# Patient Record
Sex: MALE | Race: WHITE | NOT HISPANIC OR LATINO | Employment: FULL TIME | URBAN - METROPOLITAN AREA
[De-identification: names, ages, dates, MRNs, and addresses within clinical notes are randomized per-mention and may not be internally consistent; named-entity substitution may affect disease eponyms.]

---

## 2019-06-12 ENCOUNTER — TELEPHONE (OUTPATIENT)
Dept: SCHEDULING | Facility: IMAGING CENTER | Age: 39
End: 2019-06-12

## 2019-06-13 ENCOUNTER — OFFICE VISIT (OUTPATIENT)
Dept: INTERNAL MEDICINE | Facility: MEDICAL CENTER | Age: 39
End: 2019-06-13

## 2019-06-13 VITALS
HEART RATE: 88 BPM | OXYGEN SATURATION: 94 % | TEMPERATURE: 98.4 F | SYSTOLIC BLOOD PRESSURE: 140 MMHG | BODY MASS INDEX: 39.57 KG/M2 | WEIGHT: 276.4 LBS | HEIGHT: 70 IN | DIASTOLIC BLOOD PRESSURE: 100 MMHG

## 2019-06-13 DIAGNOSIS — R10.13 EPIGASTRIC PAIN: ICD-10-CM

## 2019-06-13 DIAGNOSIS — E66.9 OBESITY (BMI 30-39.9): ICD-10-CM

## 2019-06-13 DIAGNOSIS — K29.50 OTHER CHRONIC GASTRITIS WITHOUT HEMORRHAGE: ICD-10-CM

## 2019-06-13 DIAGNOSIS — K21.9 GASTROESOPHAGEAL REFLUX DISEASE, ESOPHAGITIS PRESENCE NOT SPECIFIED: ICD-10-CM

## 2019-06-13 DIAGNOSIS — R53.83 LETHARGY: ICD-10-CM

## 2019-06-13 PROCEDURE — 99204 OFFICE O/P NEW MOD 45 MIN: CPT | Mod: GC | Performed by: INTERNAL MEDICINE

## 2019-06-13 RX ORDER — PANTOPRAZOLE SODIUM 40 MG/1
40 TABLET, DELAYED RELEASE ORAL DAILY
Qty: 90 TAB | Refills: 2 | Status: SHIPPED | OUTPATIENT
Start: 2019-06-13 | End: 2023-09-18

## 2019-06-13 RX ORDER — ESOMEPRAZOLE MAGNESIUM 20 MG/1
2 TABLET, DELAYED RELEASE ORAL DAILY
COMMUNITY
End: 2019-06-13

## 2019-06-13 RX ORDER — SUCRALFATE 1 G/1
1 TABLET ORAL
Qty: 120 TAB | Refills: 1 | Status: SHIPPED | OUTPATIENT
Start: 2019-06-13 | End: 2023-09-18

## 2019-06-13 RX ORDER — AMOXICILLIN 500 MG/1
CAPSULE ORAL
Refills: 0 | COMMUNITY
Start: 2019-05-09 | End: 2019-06-13

## 2019-06-13 NOTE — PROGRESS NOTES
New Patient to Establish    Reason to establish: New patient to establish    CC: epigastric pain, heartburn    HPI:   38-year-old pleasant male with past medical history of GERD, alcoholism (sober since 2017), questionable bipolar disorder presents to establish care and complains of epigastric pain and heartburn.    Patient complains of 4/10 vague constant nagging sick feeling and pain in the epigastric region for the past 5 days. It began after an alcohol binge where he drank half a gallon of whiskey, over 2 day period. The pain has not gradually worsened. Has associated heartburn and nausea, but no vomiting. Feels better for 20 minutes after meal intake, but then the pain recurs.   No melena, blood per rectum. Bowel movements have been irregular, no consistent diarrhea or constipation.   40 pound weight gain over the past 6 months. No changes in appetite. Has some night sweats, but denies fever or chills.     He had the same symptoms about a month ago, that lasted 3-4 days ago. Taking pantoprazole for a month after that and ran out of those about 8-9 days ago. Started taking nexium after that and is on that currently at 40mg a day. Notes mild improvement in epigastric symptoms today.      Patient gives h/o GERD symptoms since high school.   Ibuprofen 1-2 times a week for headaches and minor aches and pains, not daily.     Patient Active Problem List    Diagnosis Date Noted   • Obesity (BMI 30-39.9) 06/13/2019   • Upper abdominal pressure and constipation 02/01/2016   • Bipolar affective, mixed (HCC) 02/24/2014   • Panic attacks 02/24/2014   • Healthcare maintenance 02/24/2014       Past Medical History:   Diagnosis Date   • Anxiety    • Bipolar affective disorder (HCC)    • Depression        Current Outpatient Prescriptions   Medication Sig Dispense Refill   • pantoprazole (PROTONIX) 40 MG Tablet Delayed Response Take 1 Tab by mouth every day. 90 Tab 2   • sucralfate (CARAFATE) 1 GM Tab Take 1 Tab by mouth 4  Times a Day,Before Meals and at Bedtime. 120 Tab 1     No current facility-administered medications for this visit.        Allergies as of 06/13/2019   • (No Known Allergies)       Social History     Social History   • Marital status:      Spouse name: N/A   • Number of children: N/A   • Years of education: N/A     Occupational History   • Not on file.     Social History Main Topics   • Smoking status: Former Smoker     Packs/day: 1.00     Years: 3.00     Types: Cigarettes     Quit date: 2000   • Smokeless tobacco: Current User     Types: Chew      Comment: chewing tobacco since 2000   • Alcohol use No      Comment: alcohol use disorder prior to 2017   • Drug use: No   • Sexual activity: Not on file     Other Topics Concern   • Not on file     Social History Narrative   • No narrative on file       Family History   Problem Relation Age of Onset   • Cancer Mother         ovarian cancer?   • Alcohol/Drug Father    • Breast Cancer Paternal Grandmother        History reviewed. No pertinent surgical history.    ROS: As per HPI. Additional pertinent systems as noted below.  Constitutional: Negative for chills and fever.   HENT: Negative for ear pain and sore throat.    Eyes: Negative for discharge and redness.   Respiratory: Negative for cough, hemoptysis, wheezing and stridor.    Cardiovascular: Negative for chest pain, palpitations and leg swelling.   Gastrointestinal: epigastric discomfort and heartburn and nausea. No other abdominal pain, constipation, diarrhea, melena, blood per rectum, or vomiting.   Genitourinary: Negative for dysuria, flank pain and hematuria.   Musculoskeletal: Negative for falls and myalgias.   Skin: Negative for itching and rash.   Neurological: Negative for dizziness, seizures, loss of consciousness and headaches.   Endo/Heme/Allergies: Negative for polydipsia. Does not bruise/bleed easily.   Psychiatric/Behavioral: Negative for substance abuse and suicidal ideas.       /100 (BP  "Location: Left arm, Patient Position: Sitting, BP Cuff Size: Large adult)   Pulse 88   Temp 36.9 °C (98.4 °F)   Ht 1.784 m (5' 10.25\")   Wt (!) 125.4 kg (276 lb 6.4 oz)   SpO2 94%   BMI 39.38 kg/m²     Physical Exam  General:  Alert and oriented, No apparent distress.    Eyes: Pupils equal and reactive. No scleral icterus.    Throat: Clear no erythema or exudates noted.    Neck: Supple. No lymphadenopathy noted. Thyroid not enlarged.    Lungs: Clear to auscultation bilaterally with no wheezing or crackles.    Cardiovascular: Regular rate and rhythm. No murmurs, rubs or gallops.    Abdomen:  Benign. No rebound or guarding noted. Bowel sounds audible. No organomegaly    Extremities: No clubbing, cyanosis, edema.    Skin: Clear. No rash or suspicious skin lesions noted.      Note: I have reviewed all pertinent labs and diagnostic tests associated with this visit with specific comments listed under the assessment and plan below    Assessment and Plan    1. Epigastric pain  2. Other chronic gastritis without hemorrhage  3. Gastroesophageal reflux disease, esophagitis presence not specified  4. Lethargy  Patient with intermittent long standing epigastric complaints associated with heartburn and no red flags. However, he does complain of lethargy and fatigue. Will check CBC for anemia. Has never remained on PPI therapy for prolonged periods of time, likely cause of recurrence.   Concern for GERD, dyspepsia, gastritis, PUD, pancreatitis.   Recommended daily prolonged use of PPI and a few weeks of carafate use. Will reassess symptom resolution and if not resolving or worsening will consider H Pylori testing and/or GI referral for EGD.   -     pantoprazole (PROTONIX) 40 MG Tablet Delayed Response; Take 1 Tab by mouth every day.  -     sucralfate (CARAFATE) 1 GM Tab; Take 1 Tab by mouth 4 Times a Day,Before Meals and at Bedtime.  -     CBC WITH DIFFERENTIAL  -     LIPASE; Future  -     Comp Metabolic Panel; Future    5. " Obesity (BMI 30-39.9)  -     Patient identified as having weight management issue.  Appropriate orders and counseling given.      Followup: Return in about 4 weeks (around 7/11/2019).    Risk Assessment (discuss potential complications a function of chronic problems): x    Complexity (discuss number of co-morbidities): x    Signed by: Jorge Keller M.D.

## 2019-06-13 NOTE — PATIENT INSTRUCTIONS
Gastritis, Adult  Gastritis is soreness and swelling (inflammation) of the lining of the stomach. Gastritis can develop as a sudden onset (acute) or long-term (chronic) condition. If gastritis is not treated, it can lead to stomach bleeding and ulcers.  CAUSES   Gastritis occurs when the stomach lining is weak or damaged. Digestive juices from the stomach then inflame the weakened stomach lining. The stomach lining may be weak or damaged due to viral or bacterial infections. One common bacterial infection is the Helicobacter pylori infection. Gastritis can also result from excessive alcohol consumption, taking certain medicines, or having too much acid in the stomach.   SYMPTOMS   In some cases, there are no symptoms. When symptoms are present, they may include:  · Pain or a burning sensation in the upper abdomen.  · Nausea.  · Vomiting.  · An uncomfortable feeling of fullness after eating.  DIAGNOSIS   Your caregiver may suspect you have gastritis based on your symptoms and a physical exam. To determine the cause of your gastritis, your caregiver may perform the following:  · Blood or stool tests to check for the H pylori bacterium.  · Gastroscopy. A thin, flexible tube (endoscope) is passed down the esophagus and into the stomach. The endoscope has a light and camera on the end. Your caregiver uses the endoscope to view the inside of the stomach.  · Taking a tissue sample (biopsy) from the stomach to examine under a microscope.  TREATMENT   Depending on the cause of your gastritis, medicines may be prescribed. If you have a bacterial infection, such as an H pylori infection, antibiotics may be given. If your gastritis is caused by too much acid in the stomach, H2 blockers or antacids may be given. Your caregiver may recommend that you stop taking aspirin, ibuprofen, or other nonsteroidal anti-inflammatory drugs (NSAIDs).  HOME CARE INSTRUCTIONS  · Only take over-the-counter or prescription medicines as directed by  your caregiver.  · If you were given antibiotic medicines, take them as directed. Finish them even if you start to feel better.  · Drink enough fluids to keep your urine clear or pale yellow.  · Avoid foods and drinks that make your symptoms worse, such as:  ¨ Caffeine or alcoholic drinks.  ¨ Chocolate.  ¨ Peppermint or mint flavorings.  ¨ Garlic and onions.  ¨ Spicy foods.  ¨ Citrus fruits, such as oranges, sherri, or limes.  ¨ Tomato-based foods such as sauce, chili, salsa, and pizza.  ¨ Fried and fatty foods.  · Eat small, frequent meals instead of large meals.  SEEK IMMEDIATE MEDICAL CARE IF:   · You have black or dark red stools.  · You vomit blood or material that looks like coffee grounds.  · You are unable to keep fluids down.  · Your abdominal pain gets worse.  · You have a fever.  · You do not feel better after 1 week.  · You have any other questions or concerns.  MAKE SURE YOU:  · Understand these instructions.  · Will watch your condition.  · Will get help right away if you are not doing well or get worse.  This information is not intended to replace advice given to you by your health care provider. Make sure you discuss any questions you have with your health care provider.  Document Released: 12/12/2002 Document Revised: 06/18/2013 Document Reviewed: 09/10/2016  [employer] Interactive Patient Education © 2017 [employer]

## 2020-01-10 ENCOUNTER — TELEPHONE (OUTPATIENT)
Dept: SCHEDULING | Facility: IMAGING CENTER | Age: 40
End: 2020-01-10

## 2020-01-14 ENCOUNTER — HOSPITAL ENCOUNTER (OUTPATIENT)
Dept: LAB | Facility: MEDICAL CENTER | Age: 40
End: 2020-01-14
Attending: NURSE PRACTITIONER
Payer: COMMERCIAL

## 2020-01-14 ENCOUNTER — OFFICE VISIT (OUTPATIENT)
Dept: MEDICAL GROUP | Facility: PHYSICIAN GROUP | Age: 40
End: 2020-01-14
Payer: COMMERCIAL

## 2020-01-14 VITALS
WEIGHT: 280.4 LBS | DIASTOLIC BLOOD PRESSURE: 90 MMHG | SYSTOLIC BLOOD PRESSURE: 136 MMHG | HEIGHT: 70 IN | OXYGEN SATURATION: 94 % | BODY MASS INDEX: 40.14 KG/M2 | HEART RATE: 74 BPM | TEMPERATURE: 98.1 F

## 2020-01-14 DIAGNOSIS — Z23 NEED FOR VACCINATION: ICD-10-CM

## 2020-01-14 DIAGNOSIS — Z00.00 ANNUAL PHYSICAL EXAM: ICD-10-CM

## 2020-01-14 DIAGNOSIS — F31.60 BIPOLAR AFFECTIVE, MIXED (HCC): ICD-10-CM

## 2020-01-14 LAB
ALBUMIN SERPL BCP-MCNC: 4.9 G/DL (ref 3.2–4.9)
ALBUMIN/GLOB SERPL: 1.5 G/DL
ALP SERPL-CCNC: 47 U/L (ref 30–99)
ALT SERPL-CCNC: 51 U/L (ref 2–50)
ANION GAP SERPL CALC-SCNC: 8 MMOL/L (ref 0–11.9)
AST SERPL-CCNC: 29 U/L (ref 12–45)
BASOPHILS # BLD AUTO: 0.8 % (ref 0–1.8)
BASOPHILS # BLD: 0.07 K/UL (ref 0–0.12)
BILIRUB SERPL-MCNC: 0.6 MG/DL (ref 0.1–1.5)
BUN SERPL-MCNC: 13 MG/DL (ref 8–22)
CALCIUM SERPL-MCNC: 10.2 MG/DL (ref 8.5–10.5)
CHLORIDE SERPL-SCNC: 102 MMOL/L (ref 96–112)
CHOLEST SERPL-MCNC: 227 MG/DL (ref 100–199)
CO2 SERPL-SCNC: 29 MMOL/L (ref 20–33)
CREAT SERPL-MCNC: 1.03 MG/DL (ref 0.5–1.4)
EOSINOPHIL # BLD AUTO: 0.19 K/UL (ref 0–0.51)
EOSINOPHIL NFR BLD: 2.2 % (ref 0–6.9)
ERYTHROCYTE [DISTWIDTH] IN BLOOD BY AUTOMATED COUNT: 43.7 FL (ref 35.9–50)
EST. AVERAGE GLUCOSE BLD GHB EST-MCNC: 134 MG/DL
GLOBULIN SER CALC-MCNC: 3.3 G/DL (ref 1.9–3.5)
GLUCOSE SERPL-MCNC: 92 MG/DL (ref 65–99)
HBA1C MFR BLD: 6.3 % (ref 0–5.6)
HCT VFR BLD AUTO: 47.8 % (ref 42–52)
HDLC SERPL-MCNC: 36 MG/DL
HGB BLD-MCNC: 16.1 G/DL (ref 14–18)
IMM GRANULOCYTES # BLD AUTO: 0.03 K/UL (ref 0–0.11)
IMM GRANULOCYTES NFR BLD AUTO: 0.3 % (ref 0–0.9)
LDLC SERPL CALC-MCNC: 140 MG/DL
LYMPHOCYTES # BLD AUTO: 4.06 K/UL (ref 1–4.8)
LYMPHOCYTES NFR BLD: 46.3 % (ref 22–41)
MCH RBC QN AUTO: 31.4 PG (ref 27–33)
MCHC RBC AUTO-ENTMCNC: 33.7 G/DL (ref 33.7–35.3)
MCV RBC AUTO: 93.4 FL (ref 81.4–97.8)
MONOCYTES # BLD AUTO: 0.46 K/UL (ref 0–0.85)
MONOCYTES NFR BLD AUTO: 5.2 % (ref 0–13.4)
NEUTROPHILS # BLD AUTO: 3.96 K/UL (ref 1.82–7.42)
NEUTROPHILS NFR BLD: 45.2 % (ref 44–72)
NRBC # BLD AUTO: 0 K/UL
NRBC BLD-RTO: 0 /100 WBC
PLATELET # BLD AUTO: 243 K/UL (ref 164–446)
PMV BLD AUTO: 11.1 FL (ref 9–12.9)
POTASSIUM SERPL-SCNC: 4.1 MMOL/L (ref 3.6–5.5)
PROT SERPL-MCNC: 8.2 G/DL (ref 6–8.2)
RBC # BLD AUTO: 5.12 M/UL (ref 4.7–6.1)
SODIUM SERPL-SCNC: 139 MMOL/L (ref 135–145)
TRIGL SERPL-MCNC: 257 MG/DL (ref 0–149)
WBC # BLD AUTO: 8.8 K/UL (ref 4.8–10.8)

## 2020-01-14 PROCEDURE — 82306 VITAMIN D 25 HYDROXY: CPT

## 2020-01-14 PROCEDURE — 36415 COLL VENOUS BLD VENIPUNCTURE: CPT

## 2020-01-14 PROCEDURE — 90471 IMMUNIZATION ADMIN: CPT | Performed by: NURSE PRACTITIONER

## 2020-01-14 PROCEDURE — 80053 COMPREHEN METABOLIC PANEL: CPT

## 2020-01-14 PROCEDURE — 85025 COMPLETE CBC W/AUTO DIFF WBC: CPT

## 2020-01-14 PROCEDURE — 90686 IIV4 VACC NO PRSV 0.5 ML IM: CPT | Performed by: NURSE PRACTITIONER

## 2020-01-14 PROCEDURE — 83036 HEMOGLOBIN GLYCOSYLATED A1C: CPT

## 2020-01-14 PROCEDURE — 84443 ASSAY THYROID STIM HORMONE: CPT

## 2020-01-14 PROCEDURE — 84439 ASSAY OF FREE THYROXINE: CPT

## 2020-01-14 PROCEDURE — 99214 OFFICE O/P EST MOD 30 MIN: CPT | Mod: 25 | Performed by: NURSE PRACTITIONER

## 2020-01-14 PROCEDURE — 80061 LIPID PANEL: CPT

## 2020-01-14 NOTE — PROGRESS NOTES
Chief Complaint   Patient presents with   • Establish Care     Prior [facility] pt, HX of mental health, Hx of HTN, GERD       HISTORY OF PRESENT ILLNESS: Patient is a 39 y.o. male, established patient who presents today to discuss medical problems as listed below:    Health Maintenance:  COMPLETED.    Bipolar affective, mixed  New issue for this provider, Dx'd at , during a manic episode.  In the hospital he was started on lithium and Xanax.  I did like an effect of lithium.  Patient started using alcohol and smoking as he is coping strategies.  He quit drinking in , stopped smoking 2 years ago, chew tobacco for 2 years transitioning from smoking habits, and quit chewing 6 weeks ago, after which he went through of withdrawal.  Today pt denies SI/HI, no recent manic episodes.      Patient Active Problem List    Diagnosis Date Noted   • Annual physical exam 2020   • Obesity (BMI 30-39.9) 2019   • Upper abdominal pressure and constipation 2016   • Bipolar affective, mixed (HCC) 2014   • Panic attacks 2014   • Healthcare maintenance 2014        Allergies: Patient has no known allergies.    Current Outpatient Medications   Medication Sig Dispense Refill   • pantoprazole (PROTONIX) 40 MG Tablet Delayed Response Take 1 Tab by mouth every day. 90 Tab 2   • sucralfate (CARAFATE) 1 GM Tab Take 1 Tab by mouth 4 Times a Day,Before Meals and at Bedtime. (Patient not taking: Reported on 2020) 120 Tab 1     No current facility-administered medications for this visit.        Social History     Tobacco Use   • Smoking status: Former Smoker     Packs/day: 1.00     Years: 3.00     Pack years: 3.00     Types: Cigarettes     Last attempt to quit: 2000     Years since quittin.0   • Smokeless tobacco: Former User     Types: Chew     Quit date: 2019   • Tobacco comment: chewing tobacco since    Substance Use Topics   • Alcohol use: No     Comment: alcohol use disorder prior to  "2017   • Drug use: Yes     Types: Marijuana     Social History     Patient does not qualify to have social determinant information on file (likely too young).   Social History Narrative   • Not on file       Family History   Problem Relation Age of Onset   • Cancer Mother 55        ovarian cancer   • Alcohol/Drug Father    • Breast Cancer Paternal Grandmother        Allergies, past medical history, past surgical history, family history, social history reviewed and updated.    Review of Systems:      - Constitutional: Negative for fever, chills, unexpected weight change, and fatigue/generalized weakness.     - HEENT: Negative for headaches, vision changes, hearing changes, ear pain, ear discharge, rhinorrhea, sinus congestion, sore throat, and neck pain.      - Respiratory: Negative for cough, sputum production, chest congestion, dyspnea, wheezing, and crackles.      - Cardiovascular: Negative for chest pain, palpitations, orthopnea, and bilateral lower extremity edema.     - Gastrointestinal: On and off epigastric discomfort and pain.  Negative for heartburn, nausea, vomiting,  hematochezia, melena, diarrhea, constipation, and greasy/foul-smelling stools.     - Genitourinary: Negative for dysuria, polyuria, hematuria, pyuria, urinary urgency, and urinary incontinence.    - Musculoskeletal: Negative for myalgias, back pain, and joint pain.     - Skin: Negative for rash, itching, cyanotic skin color change.     - Neurological: Negative for dizziness, tingling, tremors, focal sensory deficit, focal weakness and headaches.     - Endo/Heme/Allergies: Does not bruise/bleed easily.     - Psychiatric/Behavioral: Positive for chronic intermittent depression anxiety.  Negative for suicidal/homicidal ideation and memory loss.      All other systems reviewed and are negative    Exam:    /90 (BP Location: Left arm, Patient Position: Sitting)   Pulse 74   Temp 36.7 °C (98.1 °F) (Temporal)   Ht 1.778 m (5' 10\")   Wt (!) " 127.2 kg (280 lb 6.4 oz)   SpO2 94%   BMI 40.23 kg/m²  Body mass index is 40.23 kg/m².    Physical Exam:  Constitutional: Well-developed and well-nourished. Not diaphoretic. No distress.   Skin: Skin is warm and dry. No rash noted.  Head: Atraumatic without lesions.  Eyes: Conjunctivae and extraocular motions are normal. Pupils are equal, round, and reactive to light. No scleral icterus.   Ears:  External ears unremarkable. Tympanic membranes clear and intact.  Nose: Nares patent. Septum midline. Turbinates without erythema nor edema. No discharge.   Mouth/Throat: Dentition is normal. Tongue normal. Oropharynx is clear and moist. Posterior pharynx without erythema or exudates.  Neck: Supple, trachea midline. Normal range of motion. No thyromegaly present. No lymphadenopathy--cervical or supraclavicular.  Cardiovascular: Regular rate and rhythm, S1 and S2 without murmur, rubs, or gallops.    Chest: Effort normal. Clear to auscultation throughout. No adventitious sounds. No CVA tenderness.  Abdomen: Soft, non tender, and without distention. Active bowel sounds in all four quadrants. No rebound, guarding, masses or HSM.  : Negative for dysuria, polyuria, hematuria, pyuria, urinary urgency, and urinary incontinence.  Extremities: No cyanosis, clubbing, erythema, nor edema. Distal pulses intact and symmetric.   Musculoskeletal: All major joints AROM full in all directions without pain.  Neurological: Alert and oriented x 3. DTRs 2+/3 and symmetric. No cranial nerve deficit. 5/5 myotomes. Sensation intact. Negative Rhomberg.  Psychiatric:  Behavior, mood, and affect are appropriate.    Assessment/Plan:  1. Need for vaccination  - Influenza Vaccine Quad Injection (PF)    2. Bipolar affective, mixed (HCC)  Uncontrolled.  Patient to follow-up with psychiatry and behavioral health.  Discussed healthy lifestyle such as healthy nutrition and exercise, choices discussed.  Patient was given handouts to [facility] behavioral  health crisis center and [facility] in case of emergencies.  He was also made aware of national suicide line and advised to call 911 if experiencing emergency.  - REFERRAL TO BEHAVIORAL HEALTH  - REFERRAL TO PSYCHIATRY    3. Annual physical exam  - CBC WITH DIFFERENTIAL; Future  - Comp Metabolic Panel; Future  - FREE THYROXINE; Future  - Lipid Profile; Future  - HEMOGLOBIN A1C; Future  - VITAMIN D,25 HYDROXY; Future  - TSH; Future    Discussed with patient possible alternative diagnoses, pt is to take all medications as prescribed. If symptoms persist FU w/PCP, if symptoms worsen go to emergency room. If experiencing any side effects from prescribed medications reports to the office immediately or go to emergency room.  Reviewed indication, dosage, usage and potential adverse effects of prescribed medications. Reviewed risks and benefits of treatment plan. Patient verbalizes understanding of all instruction and verbally agrees to plan.    Return in about 1 week (around 1/21/2020).

## 2020-01-15 LAB
25(OH)D3 SERPL-MCNC: 15 NG/ML (ref 30–100)
T4 FREE SERPL-MCNC: 0.72 NG/DL (ref 0.53–1.43)
TSH SERPL DL<=0.005 MIU/L-ACNC: 1.92 UIU/ML (ref 0.38–5.33)

## 2020-01-22 ENCOUNTER — OFFICE VISIT (OUTPATIENT)
Dept: MEDICAL GROUP | Facility: PHYSICIAN GROUP | Age: 40
End: 2020-01-22
Payer: COMMERCIAL

## 2020-01-22 VITALS
WEIGHT: 279.8 LBS | OXYGEN SATURATION: 92 % | BODY MASS INDEX: 40.06 KG/M2 | RESPIRATION RATE: 12 BRPM | TEMPERATURE: 99.2 F | DIASTOLIC BLOOD PRESSURE: 100 MMHG | SYSTOLIC BLOOD PRESSURE: 150 MMHG | HEART RATE: 80 BPM | HEIGHT: 70 IN

## 2020-01-22 DIAGNOSIS — E78.2 MIXED HYPERLIPIDEMIA: ICD-10-CM

## 2020-01-22 DIAGNOSIS — R73.03 PREDIABETES: ICD-10-CM

## 2020-01-22 DIAGNOSIS — F41.0 PANIC ATTACKS: ICD-10-CM

## 2020-01-22 DIAGNOSIS — R00.0 RACING HEART BEAT: ICD-10-CM

## 2020-01-22 DIAGNOSIS — E55.9 VITAMIN D DEFICIENCY: ICD-10-CM

## 2020-01-22 PROCEDURE — 99214 OFFICE O/P EST MOD 30 MIN: CPT | Performed by: NURSE PRACTITIONER

## 2020-01-22 RX ORDER — ROSUVASTATIN CALCIUM 10 MG/1
10 TABLET, COATED ORAL EVERY EVENING
Qty: 30 TAB | Refills: 2 | Status: SHIPPED | OUTPATIENT
Start: 2020-01-22 | End: 2020-04-20

## 2020-01-22 RX ORDER — ERGOCALCIFEROL 1.25 MG/1
50000 CAPSULE ORAL
Qty: 12 CAP | Refills: 1 | Status: SHIPPED | OUTPATIENT
Start: 2020-01-22 | End: 2023-09-18

## 2020-01-22 NOTE — ASSESSMENT & PLAN NOTE
Reviewed recent labs, noted the following:   Ref. Range 1/14/2020 11:31   Cholesterol,Tot Latest Ref Range: 100 - 199 mg/dL 227 (H)   Triglycerides Latest Ref Range: 0 - 149 mg/dL 257 (H)   HDL Latest Ref Range: >=40 mg/dL 36 (A)   LDL Latest Ref Range: <100 mg/dL 140 (H)   Patient denies chest pain, dyspnea, palpitations, lower extremity swelling.  States diet could be better, a regular exercise pattern.  The 10-year ASCVD risk score (Canby VALARIE Jr., et al., 2013) is: 3.4%    Values used to calculate the score:      Age: 39 years      Sex: Male      Is Non- : No      Diabetic: No      Tobacco smoker: No      Systolic Blood Pressure: 150 mmHg      Is BP treated: No      HDL Cholesterol: 36 mg/dL      Total Cholesterol: 227 mg/dL

## 2020-01-22 NOTE — ASSESSMENT & PLAN NOTE
Reviewed recent labs, noted day 1C at 6.3.  Patient denies polyphagia, polyuria, polydipsia.  Denies fatigue or weakness, no headaches, no CP, dyspnea or palpitations.  No changes in vision.

## 2020-01-23 NOTE — ASSESSMENT & PLAN NOTE
Chronic.  Patient has an appointment next week on Tuesday with Dr. Master Menjivar[facility], psychiatry.  History of bipolar.  Patient denies manic episode today.

## 2020-01-23 NOTE — PROGRESS NOTES
Chief Complaint   Patient presents with   • Follow-Up     FV labs.    • Depression     Pt sts he has been talking to psycology due to some depression and personal issues.        HISTORY OF PRESENT ILLNESS: Patient is a 39 y.o. male, established patient who presents today to discuss medical problems as listed below:    Health Maintenance:  COMPLETED.    Mixed hyperlipidemia  Reviewed recent labs, noted the following:   Ref. Range 1/14/2020 11:31   Cholesterol,Tot Latest Ref Range: 100 - 199 mg/dL 227 (H)   Triglycerides Latest Ref Range: 0 - 149 mg/dL 257 (H)   HDL Latest Ref Range: >=40 mg/dL 36 (A)   LDL Latest Ref Range: <100 mg/dL 140 (H)   Patient denies chest pain, dyspnea, palpitations, lower extremity swelling.  States diet could be better, a regular exercise pattern.  The 10-year ASCVD risk score (Sania SHEPPARD Jr., et al., 2013) is: 3.4%    Values used to calculate the score:      Age: 39 years      Sex: Male      Is Non- : No      Diabetic: No      Tobacco smoker: No      Systolic Blood Pressure: 150 mmHg      Is BP treated: No      HDL Cholesterol: 36 mg/dL      Total Cholesterol: 227 mg/dL    Vitamin D deficiency  Vitamin D at 15, patient is on a supplement.    Prediabetes  Reviewed recent labs, noted day 1C at 6.3.  Patient denies polyphagia, polyuria, polydipsia.  Denies fatigue or weakness, no headaches, no CP, dyspnea or palpitations.  No changes in vision.    Racing heart beat  New problem.  Patient reports chronic anxiety and depression.  Racing heartbeat only while experience anxiety.  He denies chest pain, dyspnea, extremity swelling, headaches or dizziness.  History of GERD.  Patient reports eating lots of simple carbohydrates and meats.  Risk factors are mixed hyperlipidemia and hypertension.  Also family history of heart disease.    Panic attacks  Chronic.  Patient has an appointment next week on Tuesday with Dr. Master Menjivar's, psychiatry.  History of bipolar.  Patient  denies manic episode today.      Patient Active Problem List    Diagnosis Date Noted   • Mixed hyperlipidemia 2020   • Vitamin D deficiency 2020   • Prediabetes 2020   • Racing heart beat 2020   • Annual physical exam 2020   • Obesity (BMI 30-39.9) 2019   • Upper abdominal pressure and constipation 2016   • Bipolar affective, mixed (HCC) 2014   • Panic attacks 2014   • Healthcare maintenance 2014        Allergies: Patient has no known allergies.    Current Outpatient Medications   Medication Sig Dispense Refill   • vitamin D, Ergocalciferol, (DRISDOL) 02041 units Cap capsule Take 1 Cap by mouth every 7 days. 12 Cap 1   • rosuvastatin (CRESTOR) 10 MG Tab Take 1 Tab by mouth every evening. 30 Tab 2   • pantoprazole (PROTONIX) 40 MG Tablet Delayed Response Take 1 Tab by mouth every day. 90 Tab 2   • sucralfate (CARAFATE) 1 GM Tab Take 1 Tab by mouth 4 Times a Day,Before Meals and at Bedtime. (Patient not taking: Reported on 2020) 120 Tab 1     No current facility-administered medications for this visit.        Social History     Tobacco Use   • Smoking status: Former Smoker     Packs/day: 1.00     Years: 3.00     Pack years: 3.00     Types: Cigarettes     Last attempt to quit: 2000     Years since quittin.0   • Smokeless tobacco: Former User     Types: Chew     Quit date: 2019   • Tobacco comment: chewing tobacco since    Substance Use Topics   • Alcohol use: No     Comment: alcohol use disorder prior to    • Drug use: Yes     Types: Marijuana     Social History     Patient does not qualify to have social determinant information on file (likely too young).   Social History Narrative   • Not on file       Family History   Problem Relation Age of Onset   • Cancer Mother 55        ovarian cancer   • Alcohol/Drug Father    • Breast Cancer Paternal Grandmother        Allergies, past medical history, past surgical history, family history,  "social history reviewed and updated.    Review of Systems:      - Constitutional: Negative for fever, chills, unexpected weight change, and fatigue/generalized weakness.     - HEENT: Negative for headaches, vision changes, hearing changes, ear pain, ear discharge, rhinorrhea, sinus congestion, sore throat, and neck pain.      - Respiratory: Negative for cough, sputum production, chest congestion, dyspnea, wheezing, and crackles.      - Cardiovascular: Racing heartbeat with episodes of anxiety.  Negative for chest pain, orthopnea, and bilateral lower extremity edema.     - Psychiatric/Behavioral: Depression/ anxiety.  Negative for suicidal/homicidal ideation and memory loss.      All other systems reviewed and are negative    Exam:    /100 (BP Location: Left arm, Patient Position: Sitting, BP Cuff Size: Large adult)   Pulse 80   Temp 37.3 °C (99.2 °F) (Temporal)   Resp 12   Ht 1.778 m (5' 10\")   Wt (!) 126.9 kg (279 lb 12.8 oz)   SpO2 92%   BMI 40.15 kg/m²  Body mass index is 40.15 kg/m².    Physical Exam:  Constitutional: Well-developed and well-nourished. Not diaphoretic. No distress.   Cardiovascular: Regular rate and rhythm, S1 and S2 without murmur, rubs, or gallops.    Chest: Effort normal. Clear to auscultation throughout. No adventitious sounds.   Psychiatric:  Behavior, mood, and affect are appropriate.    LABS: 1/14  results reviewed and discussed with the patient, questions answered.    Patient was seen for 25 minutes face to face of which > 50% of appointment time was spent on counseling and coordination of care regarding the above.     Assessment/Plan:  1. Vitamin D deficiency  After Rx completion, take OTC vitamin D3 5000 IUs daily.  - vitamin D, Ergocalciferol, (DRISDOL) 57864 units Cap capsule; Take 1 Cap by mouth every 7 days.  Dispense: 12 Cap; Refill: 1    2. Mixed hyperlipidemia  Uncontrolled.  Discussed etiology and the risks of uncontrolled cholesterol.  Discussed healthy lifestyle " such as exercise and healthy nutrition.  Avoid sweets, meats fried and greasy foods.  Recommend OTC omega-3, increase daily fiber intake.  - rosuvastatin (CRESTOR) 10 MG Tab; Take 1 Tab by mouth every evening.  Dispense: 30 Tab; Refill: 2  - Lipid Profile; Future  - Comp Metabolic Panel; Future    3. Prediabetes  New.  Discussed etiology and risk factors of diabetes.  Discussed healthy lifestyle.    4. Racing heart beat  New.  Most likely due to anxiety and uncontrolled acid reflux. However due to risk factors would like cardiology evaluation and recommendations.  - REFERRAL TO CARDIOLOGY    Discussed with patient possible alternative diagnoses, pt is to take all medications as prescribed. If symptoms persist FU w/PCP, if symptoms worsen go to emergency room. If experiencing any side effects from prescribed medications reports to the office immediately or go to emergency room.  Reviewed indication, dosage, usage and potential adverse effects of prescribed medications. Reviewed risks and benefits of treatment plan. Patient verbalizes understanding of all instruction and verbally agrees to plan.    Return if symptoms worsen or fail to improve.

## 2020-01-23 NOTE — ASSESSMENT & PLAN NOTE
New problem.  Patient reports chronic anxiety and depression.  Racing heartbeat only while experience anxiety.  He denies chest pain, dyspnea, extremity swelling, headaches or dizziness.  History of GERD.  Patient reports eating lots of simple carbohydrates and meats.  Risk factors are mixed hyperlipidemia and hypertension.  Also family history of heart disease.

## 2020-03-31 ENCOUNTER — OFFICE VISIT (OUTPATIENT)
Dept: URGENT CARE | Facility: PHYSICIAN GROUP | Age: 40
End: 2020-03-31
Payer: COMMERCIAL

## 2020-03-31 ENCOUNTER — HOSPITAL ENCOUNTER (OUTPATIENT)
Dept: RADIOLOGY | Facility: MEDICAL CENTER | Age: 40
End: 2020-03-31
Attending: NURSE PRACTITIONER
Payer: COMMERCIAL

## 2020-03-31 VITALS
TEMPERATURE: 97.5 F | OXYGEN SATURATION: 97 % | HEIGHT: 70 IN | HEART RATE: 74 BPM | RESPIRATION RATE: 18 BRPM | SYSTOLIC BLOOD PRESSURE: 148 MMHG | WEIGHT: 272 LBS | DIASTOLIC BLOOD PRESSURE: 98 MMHG | BODY MASS INDEX: 38.94 KG/M2

## 2020-03-31 DIAGNOSIS — S69.92XA HAND INJURY, LEFT, INITIAL ENCOUNTER: ICD-10-CM

## 2020-03-31 DIAGNOSIS — S61.432A PUNCTURE WOUND OF LEFT HAND, FOREIGN BODY PRESENCE UNSPECIFIED, INITIAL ENCOUNTER: ICD-10-CM

## 2020-03-31 PROCEDURE — 90715 TDAP VACCINE 7 YRS/> IM: CPT | Performed by: NURSE PRACTITIONER

## 2020-03-31 PROCEDURE — 90471 IMMUNIZATION ADMIN: CPT | Performed by: NURSE PRACTITIONER

## 2020-03-31 PROCEDURE — 99214 OFFICE O/P EST MOD 30 MIN: CPT | Mod: 25 | Performed by: NURSE PRACTITIONER

## 2020-03-31 PROCEDURE — 12002 RPR S/N/AX/GEN/TRNK2.6-7.5CM: CPT | Performed by: NURSE PRACTITIONER

## 2020-03-31 PROCEDURE — 73130 X-RAY EXAM OF HAND: CPT | Mod: LT

## 2020-03-31 RX ORDER — OXYCODONE HYDROCHLORIDE AND ACETAMINOPHEN 5; 325 MG/1; MG/1
1 TABLET ORAL EVERY 6 HOURS PRN
Qty: 16 TAB | Refills: 0 | Status: SHIPPED | OUTPATIENT
Start: 2020-03-31 | End: 2020-04-04

## 2020-03-31 RX ORDER — KETOROLAC TROMETHAMINE 30 MG/ML
30 INJECTION, SOLUTION INTRAMUSCULAR; INTRAVENOUS ONCE
Status: COMPLETED | OUTPATIENT
Start: 2020-03-31 | End: 2020-03-31

## 2020-03-31 RX ORDER — HYDROCODONE BITARTRATE AND ACETAMINOPHEN 7.5; 325 MG/1; MG/1
1 TABLET ORAL EVERY 6 HOURS PRN
Qty: 16 TAB | Refills: 0 | Status: SHIPPED | OUTPATIENT
Start: 2020-03-31 | End: 2020-03-31

## 2020-03-31 RX ORDER — TRAMADOL HYDROCHLORIDE 50 MG/1
50 TABLET ORAL EVERY 4 HOURS PRN
COMMUNITY
End: 2020-04-06

## 2020-03-31 RX ORDER — IBUPROFEN 200 MG
200 TABLET ORAL EVERY 6 HOURS PRN
COMMUNITY

## 2020-03-31 RX ORDER — CEPHALEXIN 500 MG/1
500 CAPSULE ORAL 4 TIMES DAILY
Qty: 28 CAP | Refills: 0 | Status: SHIPPED | OUTPATIENT
Start: 2020-03-31 | End: 2020-04-07

## 2020-03-31 RX ADMIN — KETOROLAC TROMETHAMINE 30 MG: 30 INJECTION, SOLUTION INTRAMUSCULAR; INTRAVENOUS at 12:05

## 2020-03-31 ASSESSMENT — ENCOUNTER SYMPTOMS
TINGLING: 0
SENSORY CHANGE: 0
MYALGIAS: 1
CHILLS: 0
WEAKNESS: 0
BRUISES/BLEEDS EASILY: 0
FEVER: 0

## 2020-03-31 ASSESSMENT — FIBROSIS 4 INDEX: FIB4 SCORE: 0.69

## 2020-03-31 NOTE — PROGRESS NOTES
"Subjective:      Martir Hyatt is a 39 y.o. male who presents with Hand Injury (drilled hole in L hand/ring finger [id number])            HPI  States drilled into his left hand last night while working on a wood project in his garage. States the 3/4\" power drill bit drilled into his center palm. Cleaned with rubbing alcohol after incident. Denies numbness/tingling to fingers but unable to completely extend his 4th digit. No previous hand injury. Took 5 doses of Ibuprofen and took \"old rx\" of tramadol 3x after incident throughout night and hand is still painful.    PMH:  has a past medical history of Anxiety, Bipolar affective disorder (HCC), Depression, and GERD (gastroesophageal reflux disease).  MEDS:   Current Outpatient Medications:   •  ibuprofen (MOTRIN) 200 MG Tab, Take 200 mg by mouth every 6 hours as needed., Disp: , Rfl:   •  tramadol (ULTRAM) 50 MG Tab, Take 50 mg by mouth every four hours as needed., Disp: , Rfl:   •  HYDROcodone-acetaminophen (NORCO) 7.5-325 MG per tablet, Take 1 Tab by mouth every 6 hours as needed for Severe Pain for up to 4 days. Causes drowsiness, do not drive or work while using., Disp: 16 Tab, Rfl: 0  •  cephALEXin (KEFLEX) 500 MG Cap, Take 1 Cap by mouth 4 times a day for 7 days., Disp: 28 Cap, Rfl: 0  •  vitamin D, Ergocalciferol, (DRISDOL) 45301 units Cap capsule, Take 1 Cap by mouth every 7 days., Disp: 12 Cap, Rfl: 1  •  rosuvastatin (CRESTOR) 10 MG Tab, Take 1 Tab by mouth every evening., Disp: 30 Tab, Rfl: 2  •  pantoprazole (PROTONIX) 40 MG Tablet Delayed Response, Take 1 Tab by mouth every day., Disp: 90 Tab, Rfl: 2  •  sucralfate (CARAFATE) 1 GM Tab, Take 1 Tab by mouth 4 Times a Day,Before Meals and at Bedtime. (Patient not taking: Reported on 1/14/2020), Disp: 120 Tab, Rfl: 1    Current Facility-Administered Medications:   •  ketorolac (TORADOL) injection 30 mg, 30 mg, Intramuscular, Once, Maddie Potts ABrendaP.R.N.  ALLERGIES: No Known Allergies  SURGHX: History " "reviewed. No pertinent surgical history.  SOCHX:  reports that he quit smoking about 20 years ago. His smoking use included cigarettes. He has a 3.00 pack-year smoking history. He quit smokeless tobacco use about 4 months ago.  His smokeless tobacco use included chew. He reports current drug use. Drug: Marijuana. He reports that he does not drink alcohol.  FH: Family history was reviewed, no pertinent findings to report    Review of Systems   Constitutional: Negative for chills, fever and malaise/fatigue.   Musculoskeletal: Positive for myalgias. Negative for joint pain.   Skin: Negative for itching and rash.   Neurological: Negative for tingling, sensory change and weakness.   Endo/Heme/Allergies: Does not bruise/bleed easily.   All other systems reviewed and are negative.         Objective:     /98   Pulse 74   Temp 36.4 °C (97.5 °F) (Temporal)   Resp 18   Ht 1.778 m (5' 10\")   Wt 123.4 kg (272 lb)   SpO2 97%   BMI 39.03 kg/m²      Physical Exam  Vitals signs reviewed.   Constitutional:       General: He is awake. He is not in acute distress.     Appearance: Normal appearance. He is well-developed. He is not ill-appearing, toxic-appearing or diaphoretic.   HENT:      Head: Normocephalic.   Cardiovascular:      Rate and Rhythm: Normal rate.   Pulmonary:      Effort: Pulmonary effort is normal. No accessory muscle usage or respiratory distress.   Musculoskeletal:         General: Tenderness and signs of injury present. No swelling or deformity.      Left hand: He exhibits tenderness and laceration. He exhibits normal range of motion, no bony tenderness, normal two-point discrimination, normal capillary refill, no deformity and no swelling. Normal sensation noted. Decreased strength noted.        Hands:       Comments: Approx 3 cm laceration/puncture wound with irregular border to mid palm at 4th metacarpal. Able to close fist with discomfort. No swelling, redness, bruising, foreign material or d/c from " site. Difficulty with full extension of 4th digit. TTP surrounding puncture wound. No surrounding redness, bruising, debris or d/c. No exit wound on dorsal aspect of left hand. Site cleaned with dilute hibiclens and copious saline rinse by medical assistant. No active bleeding.  Procedure: Laceration Repair  -Risks including bleeding, nerve damage, infection, and poor cosmetic outcome discussed. Benefits and alternatives discussed.   -Clean technique with sterile instruments and suture used  -Local anesthesia with 2% lidocaine  -Closed with #8 4-0 Nylon interrupted sutures with good wound approximation  -Polysporin and dressing placed  -Patient tolerated well         Skin:     General: Skin is warm and dry.      Findings: Laceration present. No abrasion, bruising, ecchymosis or erythema.   Neurological:      Mental Status: He is alert and oriented to person, place, and time.   Psychiatric:         Behavior: Behavior is cooperative.               [facility] Aware web site evaluation: I have obtained and reviewed patient utilization report from [facility] pharmacy database prior to writing prescription for controlled substance II, III or IV. Based on the report and my clinical assessment the prescription is medically necessary        Assessment/Plan:       1. Hand injury, left, initial encounter    - DX-HAND 3+ LEFT; Future  - ketorolac (TORADOL) injection 30 mg  - cephALEXin (KEFLEX) 500 MG Cap; Take 1 Cap by mouth 4 times a day for 7 days.  Dispense: 28 Cap; Refill: 0  - oxyCODONE-acetaminophen (PERCOCET) 5-325 MG Tab; Take 1 Tab by mouth every 6 hours as needed for up to 4 days.  Dispense: 16 Tab; Refill: 0    2. Puncture wound of left hand, foreign body presence unspecified, initial encounter    - DX-HAND 3+ LEFT; Future  - ketorolac (TORADOL) injection 30 mg  - cephALEXin (KEFLEX) 500 MG Cap; Take 1 Cap by mouth 4 times a day for 7 days.  Dispense: 28 Cap; Refill: 0  - Tdap =>8yo IM  - oxyCODONE-acetaminophen  (PERCOCET) 5-325 MG Tab; Take 1 Tab by mouth every 6 hours as needed for up to 4 days.  Dispense: 16 Tab; Refill: 0    May use NSAID prn for pain/swelling  May use cool compresses for swelling prn  May utilize RICE method prn  Avoid excessive weight lifting to avoid further injury  May apply topical analgesics prn  Perform proper body mechanics with lift/carry, push/pull  Monitor for deformity, numbness/tingling in fingers, decreased ROM with weight lifting- need re-evaluation  F/u ortho regarding possible tendon/ligament problem (patient opts to go to [facility] today for evaluation as he is an established patient)  Suture removal in 7-10 days

## 2020-04-02 ENCOUNTER — TELEPHONE (OUTPATIENT)
Dept: URGENT CARE | Facility: PHYSICIAN GROUP | Age: 40
End: 2020-04-02

## 2020-04-02 NOTE — TELEPHONE ENCOUNTER
"Pt called stating he took all of his oxycodone and is requesting more or \"something stronger\" he said he went to [facility] and they would not do anything for him, I did advised we do not do refills in urgent care, he was angry and wanted answer now, I explained the only thing I could do is send you a message or he could come into the clinic and bee seen again which he refused. Please advise. sj  "

## 2020-04-03 DIAGNOSIS — M79.642 HAND PAIN, LEFT: ICD-10-CM

## 2020-04-03 DIAGNOSIS — S61.432A PUNCTURE WOUND OF LEFT HAND WITHOUT FOREIGN BODY, INITIAL ENCOUNTER: ICD-10-CM

## 2020-04-03 NOTE — PROGRESS NOTES
"Patient to be referred to Physiatry for pain management. Must be seen in Urgent Care again for further evaluation of pain until seen by Physiatry. Already seen in [facility] and states \"nothing was done\".   "

## 2020-04-04 ENCOUNTER — OFFICE VISIT (OUTPATIENT)
Dept: URGENT CARE | Facility: PHYSICIAN GROUP | Age: 40
End: 2020-04-04
Payer: COMMERCIAL

## 2020-04-04 VITALS
OXYGEN SATURATION: 97 % | HEIGHT: 71 IN | DIASTOLIC BLOOD PRESSURE: 90 MMHG | WEIGHT: 273 LBS | HEART RATE: 74 BPM | BODY MASS INDEX: 38.22 KG/M2 | SYSTOLIC BLOOD PRESSURE: 130 MMHG | TEMPERATURE: 98.6 F | RESPIRATION RATE: 15 BRPM

## 2020-04-04 DIAGNOSIS — S69.92XA HAND INJURY, LEFT, INITIAL ENCOUNTER: ICD-10-CM

## 2020-04-04 DIAGNOSIS — S61.432A PUNCTURE WOUND OF LEFT HAND, FOREIGN BODY PRESENCE UNSPECIFIED, INITIAL ENCOUNTER: ICD-10-CM

## 2020-04-04 PROCEDURE — 99214 OFFICE O/P EST MOD 30 MIN: CPT | Performed by: FAMILY MEDICINE

## 2020-04-04 RX ORDER — OXYCODONE AND ACETAMINOPHEN 10; 325 MG/1; MG/1
1 TABLET ORAL EVERY 8 HOURS PRN
Qty: 10 TAB | Refills: 0 | Status: SHIPPED | OUTPATIENT
Start: 2020-04-04 | End: 2020-04-06

## 2020-04-04 ASSESSMENT — FIBROSIS 4 INDEX: FIB4 SCORE: 0.69

## 2020-04-04 NOTE — PROGRESS NOTES
"Subjective:      Chief Complaint   Patient presents with   • Follow-Up     requesting a refill for pain medication              hand Injury      4 d s/p puncture wound to left hand.       Taking keflex without issue       Denies fever      States left hand is constant , throbbing pain , tender to touch.   Better with percocet, but feels that needs more to control pain             The pain does not radiate.  Pertinent negatives include no chest pain, muscle weakness, numbness or tingling.   Past medical history was unremarkable and not pertinent to current issue  Social hx - denies tobacco, alcohol, drug use  Family hx was reviewed - no pertinent past family hx      Review of Systems   Constitutional: Negative for fever.   Respiratory: Negative for shortness of breath.    Cardiovascular: Negative for chest pain.   Neurological: Negative for tingling and numbness.   10 point ROS otherwise negative, except per HPI           Objective:     /90   Pulse 74   Temp 37 °C (98.6 °F)   Resp 15   Ht 1.803 m (5' 11\")   Wt 123.8 kg (273 lb)   SpO2 97%       Physical Exam   Constitutional: pt is oriented to person, place, and time. Pt appears well-developed and well-nourished. No distress.   HENT:   Head: Normocephalic and atraumatic.   Eyes: Conjunctivae are normal.   Cardiovascular: Normal rate.    Pulmonary/Chest: Effort normal.   Musculoskeletal:        Left hand :   Puncture wound is C/D/I with sutures intact.  No erythema or red streaking.   Wound is TTP           Neurological: pt is alert and oriented to person, place, and time.   Skin: Skin is warm. Pt is not diaphoretic. No erythema.   Nursing note and vitals reviewed.              Assessment/Plan:        1. Puncture wound of left hand, foreign body presence unspecified, initial encounter        - REFERRAL TO HAND SURGERY  - oxyCODONE-acetaminophen (PERCOCET-10)  MG Tab; Take 1 Tab by mouth every 8 hours as needed for Severe Pain for up to 7 days.  " Dispense: 10 Tab; Refill: 0    Narcotic use report obtained with no indication of narcotic overuse or misuse and deemed necessary for treaatment. Sedation, dependence, and constipation precautions given. Avoid use while driving or operating heavy machinery.

## 2020-04-06 ENCOUNTER — HOSPITAL ENCOUNTER (OUTPATIENT)
Dept: RADIOLOGY | Facility: MEDICAL CENTER | Age: 40
End: 2020-04-06
Attending: FAMILY MEDICINE
Payer: COMMERCIAL

## 2020-04-06 ENCOUNTER — TELEPHONE (OUTPATIENT)
Dept: URGENT CARE | Facility: PHYSICIAN GROUP | Age: 40
End: 2020-04-06

## 2020-04-06 ENCOUNTER — OFFICE VISIT (OUTPATIENT)
Dept: URGENT CARE | Facility: PHYSICIAN GROUP | Age: 40
End: 2020-04-06
Payer: COMMERCIAL

## 2020-04-06 VITALS
HEIGHT: 70 IN | SYSTOLIC BLOOD PRESSURE: 102 MMHG | DIASTOLIC BLOOD PRESSURE: 72 MMHG | OXYGEN SATURATION: 92 % | WEIGHT: 273 LBS | TEMPERATURE: 99.2 F | BODY MASS INDEX: 39.08 KG/M2 | HEART RATE: 74 BPM

## 2020-04-06 DIAGNOSIS — T14.8XXA WOUND OF SKIN: ICD-10-CM

## 2020-04-06 PROCEDURE — 99213 OFFICE O/P EST LOW 20 MIN: CPT | Performed by: FAMILY MEDICINE

## 2020-04-06 PROCEDURE — 73130 X-RAY EXAM OF HAND: CPT | Mod: LT

## 2020-04-06 RX ORDER — OXYCODONE AND ACETAMINOPHEN 10; 325 MG/1; MG/1
1-2 TABLET ORAL EVERY 8 HOURS PRN
Qty: 10 TAB | Refills: 0 | Status: SHIPPED | OUTPATIENT
Start: 2020-04-06 | End: 2020-04-10

## 2020-04-06 ASSESSMENT — FIBROSIS 4 INDEX: FIB4 SCORE: 0.69

## 2020-04-06 ASSESSMENT — PAIN SCALES - GENERAL: PAINLEVEL: 8=MODERATE-SEVERE PAIN

## 2020-04-06 NOTE — PROGRESS NOTES
Subjective:      Martir Hyatt is a 39 y.o. male who presents with Medication Refill (medication refill, pain still there, medications helped a little, took about 4 tabs of pain medication every 8 hrs)      - This is a pleasant and non toxic appearing 39 y.o. male with c/o here b/c he ran out of pain meds for his PW of Lt hand that he was seen here initially for. Has had f/u and seen by [facility] and they told him to f/u w/ them in 3 wks but he says he is out of pain meds.    We discussed I will give him 1 refill today but after this I will no longer prescribe him anything for pain. He will need to f/u w/ pain management or [facility]            ALLERGIES:  Codeine     PMH:  Past Medical History:   Diagnosis Date   • Anxiety    • Bipolar affective disorder (HCC)    • Depression    • GERD (gastroesophageal reflux disease)         PSH:  History reviewed. No pertinent surgical history.    MEDS:    Current Outpatient Medications:   •  oxyCODONE-acetaminophen (PERCOCET)  MG Tab, Take 1-2 Tabs by mouth every 8 hours as needed for Severe Pain for up to 4 days., Disp: 10 Tab, Rfl: 0  •  cephALEXin (KEFLEX) 500 MG Cap, Take 1 Cap by mouth 4 times a day for 7 days., Disp: 28 Cap, Rfl: 0  •  rosuvastatin (CRESTOR) 10 MG Tab, Take 1 Tab by mouth every evening., Disp: 30 Tab, Rfl: 2  •  pantoprazole (PROTONIX) 40 MG Tablet Delayed Response, Take 1 Tab by mouth every day., Disp: 90 Tab, Rfl: 2  •  ibuprofen (MOTRIN) 200 MG Tab, Take 200 mg by mouth every 6 hours as needed., Disp: , Rfl:   •  vitamin D, Ergocalciferol, (DRISDOL) 70695 units Cap capsule, Take 1 Cap by mouth every 7 days. (Patient not taking: Reported on 4/6/2020), Disp: 12 Cap, Rfl: 1  •  sucralfate (CARAFATE) 1 GM Tab, Take 1 Tab by mouth 4 Times a Day,Before Meals and at Bedtime. (Patient not taking: Reported on 1/14/2020), Disp: 120 Tab, Rfl: 1    ** I have documented what I find to be significant in regards to past medical, social, family and surgical history  in  "my HPI or under PMH/PSH/FH review section, otherwise it is contributory **           HPI    Review of Systems   Skin:        Lt palm pain    All other systems reviewed and are negative.         Objective:     /72   Pulse 74   Temp 37.3 °C (99.2 °F)   Ht 1.778 m (5' 10\")   Wt 123.8 kg (273 lb)   SpO2 92%   BMI 39.17 kg/m²      Physical Exam  Vitals signs and nursing note reviewed.   Constitutional:       General: He is not in acute distress.     Appearance: He is well-developed. He is not diaphoretic.   HENT:      Head: Normocephalic and atraumatic.   Eyes:      Conjunctiva/sclera: Conjunctivae normal.   Cardiovascular:      Heart sounds: Normal heart sounds. No murmur.   Pulmonary:      Effort: Pulmonary effort is normal. No respiratory distress.      Breath sounds: Normal breath sounds.   Musculoskeletal: Normal range of motion.         General: Tenderness and signs of injury present. No swelling or deformity.      Comments: Lt palm: well healing superficial wound ~1x2cm w/o signs infection. Good flex/ext, NVI fingers   Skin:     General: Skin is warm and dry.   Neurological:      Mental Status: He is alert.      Motor: No abnormal muscle tone.   Psychiatric:         Judgment: Judgment normal.                 Assessment/Plan:           1. Wound of skin  DX-HAND 3+ LEFT    REFERRAL TO PAIN MANAGEMENT    oxyCODONE-acetaminophen (PERCOCET)  MG Tab       - rest/elevate hand  - wound dressed and ortho glass splint applied   - E.R. precautions discussed     Dx & d/c instructions discussed w/ patient and/or family members.     Follow up with PCP (or UC if PCP is unavailable) in 2-3 days to make sure improving and no further additional treatment needed, ER if not improving or feeling/getting worse.    Any realistic and/or common medication side effects that may have been given today(i.e. Rash, GI upset/constipation, sedation, elevation of BP or blood sugars) reviewed.     Patient left in stable condition "      reviewed if narcotics given

## 2020-04-19 DIAGNOSIS — E78.2 MIXED HYPERLIPIDEMIA: ICD-10-CM

## 2020-04-20 RX ORDER — ROSUVASTATIN CALCIUM 10 MG/1
TABLET, COATED ORAL
Qty: 30 TAB | Refills: 2 | Status: SHIPPED | OUTPATIENT
Start: 2020-04-20 | End: 2023-09-18

## 2022-09-14 NOTE — ASSESSMENT & PLAN NOTE
New issue for this provider, Dx'd at 2006, during a manic episode.  In the hospital he was started on lithium and Xanax.  I did like an effect of lithium.  Patient started using alcohol and smoking as he is coping strategies.  He quit drinking in 2017, stopped smoking 2 years ago, chew tobacco for 2 years transitioning from smoking habits, and quit chewing 6 weeks ago, after which he went through of withdrawal.  Today pt denies SI/HI, no recent manic episodes.   Subjective:      Patient ID: Herrera Shelby is a 27 y.o. male.     HPI    Review of Systems    Objective:   Physical Exam    Assessment:      ***      Plan:      ***        Caroline Montgomery LPN

## 2023-08-16 ENCOUNTER — TELEMEDICINE (OUTPATIENT)
Dept: TELEHEALTH | Facility: TELEMEDICINE | Age: 43
End: 2023-08-16
Payer: COMMERCIAL

## 2023-08-16 DIAGNOSIS — L30.9 DERMATITIS: ICD-10-CM

## 2023-08-16 PROBLEM — S86.902A: Status: ACTIVE | Noted: 2019-08-19

## 2023-08-16 PROCEDURE — 99203 OFFICE O/P NEW LOW 30 MIN: CPT | Mod: 95

## 2023-08-16 RX ORDER — PANTOPRAZOLE SODIUM 40 MG/1
TABLET, DELAYED RELEASE ORAL
COMMUNITY
Start: 2019-06-13 | End: 2023-09-18

## 2023-08-16 RX ORDER — NAPROXEN 500 MG/1
TABLET ORAL
COMMUNITY
Start: 2019-08-19

## 2023-08-16 NOTE — PROGRESS NOTES
Virtual Visit: New Patient   This visit was conducted via Zoom using secure and encrypted videoconferencing technology.   The patient was in their home in the state of [place].    The patient's identity was confirmed and verbal consent was obtained for this virtual visit.    Subjective:     CC: No chief complaint on file.    Martir Hyatt is a 42 y.o. male presenting to [facility] care and to discuss the evaluation and management of:    Patient presents to virtual urgent care with concerns of rash around his right eye.  He reports he traveled to see the [employer] over the weekend when he developed right sided upper eyelid redness, mild swelling.  States the rash started to spread to his left eye this morning.  Reports itching. Denies pain.   Denies periorbital tenderness, pain with EOM  Denies vision disturbances, headache, photosensitivity  Denies discharge from his eyes  Has not tried anything over-the-counter      ROS  See HPI    Current medicines (including changes today)  Current Outpatient Medications   Medication Sig Dispense Refill    pantoprazole (PROTONIX) 40 MG Tablet Delayed Response PANTOPRAZOLE SODIUM 40 MG TBEC      naproxen (NAPROSYN) 500 MG Tab NAPROXEN 500 MG TABS      rosuvastatin (CRESTOR) 10 MG Tab TAKE 1 TABLET BY MOUTH EVERY DAY IN THE EVENING 30 Tab 2    ibuprofen (MOTRIN) 200 MG Tab Take 200 mg by mouth every 6 hours as needed.      vitamin D, Ergocalciferol, (DRISDOL) 53666 units Cap capsule Take 1 Cap by mouth every 7 days. (Patient not taking: Reported on 4/6/2020) 12 Cap 1    pantoprazole (PROTONIX) 40 MG Tablet Delayed Response Take 1 Tab by mouth every day. 90 Tab 2    sucralfate (CARAFATE) 1 GM Tab Take 1 Tab by mouth 4 Times a Day,Before Meals and at Bedtime. (Patient not taking: Reported on 1/14/2020) 120 Tab 1     No current facility-administered medications for this visit.       Patient Active Problem List    Diagnosis Date Noted    Mixed hyperlipidemia 01/22/2020    Vitamin D  deficiency 01/22/2020    Prediabetes 01/22/2020    Racing heart beat 01/22/2020    Annual physical exam 01/14/2020    Unspecified injury of unspecified muscle(s) and tendon(s) at lower leg level, left leg, initial encounter 08/19/2019    Obesity (BMI 30-39.9) 06/13/2019    Upper abdominal pressure and constipation 02/01/2016    Bipolar affective, mixed (HCC) 02/24/2014    Panic attacks 02/24/2014    Healthcare maintenance 02/24/2014        Objective:   There were no vitals taken for this visit.    Physical Exam  Constitutional:       General: He is not in acute distress.     Appearance: Normal appearance.   HENT:      Head: No right periorbital erythema or left periorbital erythema.   Eyes:      General:         Right eye: No discharge.         Left eye: No discharge.      Extraocular Movements: Extraocular movements intact.      Conjunctiva/sclera:      Right eye: Right conjunctiva is not injected. No exudate.     Left eye: Left conjunctiva is not injected. No exudate.     Comments: Right upper and lower eyelids are mildly erythematous. There is trace edema of the lower eyelid and the tear trough.     Left lower eyelid and tear trough are also slightly erythematous and edematous.     No periorbital tenderness during palpation. No ptosis. Conjunctiva are white. No discharge present bilaterally. EOM intact and are painless.   Pulmonary:      Effort: Pulmonary effort is normal.   Skin:     Findings: Rash present.   Neurological:      Mental Status: He is alert.          Assessment and Plan:   The following treatment plan was discussed:     1. Dermatitis    No signs of active infection.   Recommended: washing face with gentle unscented soap, pat dry, apply thin layer of otc unscented skin emollients. Oral antihistamines for itching. Avoid rubbing eyes.    Follow-up:Return to UC if symptoms fail to improve or worsen. Strict return to ER precautions reviewed. Follow up with PCP advised. Patient verbalized understanding  and consented to plan of care.

## 2023-09-14 ENCOUNTER — HOSPITAL ENCOUNTER (EMERGENCY)
Facility: MEDICAL CENTER | Age: 43
End: 2023-09-15
Attending: STUDENT IN AN ORGANIZED HEALTH CARE EDUCATION/TRAINING PROGRAM
Payer: COMMERCIAL

## 2023-09-14 ENCOUNTER — APPOINTMENT (OUTPATIENT)
Dept: RADIOLOGY | Facility: MEDICAL CENTER | Age: 43
End: 2023-09-14
Attending: STUDENT IN AN ORGANIZED HEALTH CARE EDUCATION/TRAINING PROGRAM
Payer: COMMERCIAL

## 2023-09-14 ENCOUNTER — APPOINTMENT (OUTPATIENT)
Dept: RADIOLOGY | Facility: MEDICAL CENTER | Age: 43
End: 2023-09-14
Attending: SURGERY
Payer: COMMERCIAL

## 2023-09-14 DIAGNOSIS — S71.112A STAB WOUND OF LEFT THIGH, INITIAL ENCOUNTER: ICD-10-CM

## 2023-09-14 PROBLEM — S02.2XXA CLOSED FRACTURE OF NASAL BONE: Status: ACTIVE | Noted: 2023-09-14

## 2023-09-14 PROBLEM — F10.929 ACUTE ALCOHOL INTOXICATION (HCC): Status: ACTIVE | Noted: 2023-09-14

## 2023-09-14 PROBLEM — S00.81XA FACIAL ABRASION, INITIAL ENCOUNTER: Status: ACTIVE | Noted: 2023-09-14

## 2023-09-14 PROBLEM — S71.132A: Status: ACTIVE | Noted: 2023-09-14

## 2023-09-14 PROBLEM — S83.002A PATELLAR SUBLUXATION, LEFT, INITIAL ENCOUNTER: Status: ACTIVE | Noted: 2023-09-14

## 2023-09-14 PROBLEM — T14.90XA TRAUMA: Status: ACTIVE | Noted: 2023-09-14

## 2023-09-14 LAB
ABO + RH BLD: NORMAL
ABO GROUP BLD: NORMAL
ALBUMIN SERPL BCP-MCNC: 5 G/DL (ref 3.2–4.9)
ALBUMIN/GLOB SERPL: 1.6 G/DL
ALP SERPL-CCNC: 55 U/L (ref 30–99)
ALT SERPL-CCNC: 16 U/L (ref 2–50)
ANION GAP SERPL CALC-SCNC: 16 MMOL/L (ref 7–16)
APTT PPP: 26.1 SEC (ref 24.7–36)
AST SERPL-CCNC: 21 U/L (ref 12–45)
BILIRUB SERPL-MCNC: 0.7 MG/DL (ref 0.1–1.5)
BLD GP AB SCN SERPL QL: NORMAL
BUN SERPL-MCNC: 16 MG/DL (ref 8–22)
CALCIUM ALBUM COR SERPL-MCNC: 8.7 MG/DL (ref 8.5–10.5)
CALCIUM SERPL-MCNC: 9.5 MG/DL (ref 8.5–10.5)
CFT BLD TEG: 6.3 MIN (ref 4.6–9.1)
CFT P HPASE BLD TEG: 5.4 MIN (ref 4.3–8.3)
CHLORIDE SERPL-SCNC: 102 MMOL/L (ref 96–112)
CLOT ANGLE BLD TEG: 73.9 DEGREES (ref 63–78)
CLOT LYSIS 30M P MA LENFR BLD TEG: 1.1 % (ref 0–2.6)
CO2 SERPL-SCNC: 22 MMOL/L (ref 20–33)
CREAT SERPL-MCNC: 1.11 MG/DL (ref 0.5–1.4)
CT.EXTRINSIC BLD ROTEM: 1.2 MIN (ref 0.8–2.1)
ERYTHROCYTE [DISTWIDTH] IN BLOOD BY AUTOMATED COUNT: 45.1 FL (ref 35.9–50)
ETHANOL BLD-MCNC: 131.6 MG/DL
GFR SERPLBLD CREATININE-BSD FMLA CKD-EPI: 84 ML/MIN/1.73 M 2
GLOBULIN SER CALC-MCNC: 3.2 G/DL (ref 1.9–3.5)
GLUCOSE SERPL-MCNC: 116 MG/DL (ref 65–99)
HCT VFR BLD AUTO: 43.8 % (ref 42–52)
HGB BLD-MCNC: 14.8 G/DL (ref 14–18)
INR PPP: 1.11 (ref 0.87–1.13)
MCF BLD TEG: 61 MM (ref 52–69)
MCF.PLATELET INHIB BLD ROTEM: 20.1 MM (ref 15–32)
MCH RBC QN AUTO: 31 PG (ref 27–33)
MCHC RBC AUTO-ENTMCNC: 33.8 G/DL (ref 32.3–36.5)
MCV RBC AUTO: 91.6 FL (ref 81.4–97.8)
PA AA BLD-ACNC: 15.1 % (ref 0–11)
PA ADP BLD-ACNC: 22.9 % (ref 0–17)
PLATELET # BLD AUTO: 283 K/UL (ref 164–446)
PMV BLD AUTO: 9.9 FL (ref 9–12.9)
POTASSIUM SERPL-SCNC: 3.9 MMOL/L (ref 3.6–5.5)
PROT SERPL-MCNC: 8.2 G/DL (ref 6–8.2)
PROTHROMBIN TIME: 14.4 SEC (ref 12–14.6)
RBC # BLD AUTO: 4.78 M/UL (ref 4.7–6.1)
RH BLD: NORMAL
SODIUM SERPL-SCNC: 140 MMOL/L (ref 135–145)
TEG ALGORITHM TGALG: ABNORMAL
WBC # BLD AUTO: 12.2 K/UL (ref 4.8–10.8)

## 2023-09-14 PROCEDURE — 86850 RBC ANTIBODY SCREEN: CPT

## 2023-09-14 PROCEDURE — 90715 TDAP VACCINE 7 YRS/> IM: CPT | Performed by: STUDENT IN AN ORGANIZED HEALTH CARE EDUCATION/TRAINING PROGRAM

## 2023-09-14 PROCEDURE — 307744 HCHG RED TRAUMA TEAM SERVICES

## 2023-09-14 PROCEDURE — 70486 CT MAXILLOFACIAL W/O DYE: CPT

## 2023-09-14 PROCEDURE — 85576 BLOOD PLATELET AGGREGATION: CPT | Mod: 91

## 2023-09-14 PROCEDURE — 86901 BLOOD TYPING SEROLOGIC RH(D): CPT

## 2023-09-14 PROCEDURE — 700111 HCHG RX REV CODE 636 W/ 250 OVERRIDE (IP): Mod: JZ | Performed by: STUDENT IN AN ORGANIZED HEALTH CARE EDUCATION/TRAINING PROGRAM

## 2023-09-14 PROCEDURE — 99285 EMERGENCY DEPT VISIT HI MDM: CPT

## 2023-09-14 PROCEDURE — 96374 THER/PROPH/DIAG INJ IV PUSH: CPT

## 2023-09-14 PROCEDURE — 90471 IMMUNIZATION ADMIN: CPT

## 2023-09-14 PROCEDURE — 73552 X-RAY EXAM OF FEMUR 2/>: CPT | Mod: LT

## 2023-09-14 PROCEDURE — 96375 TX/PRO/DX INJ NEW DRUG ADDON: CPT

## 2023-09-14 PROCEDURE — 73110 X-RAY EXAM OF WRIST: CPT | Mod: LT

## 2023-09-14 PROCEDURE — 85347 COAGULATION TIME ACTIVATED: CPT

## 2023-09-14 PROCEDURE — 80053 COMPREHEN METABOLIC PANEL: CPT

## 2023-09-14 PROCEDURE — 85384 FIBRINOGEN ACTIVITY: CPT

## 2023-09-14 PROCEDURE — 85610 PROTHROMBIN TIME: CPT

## 2023-09-14 PROCEDURE — 70450 CT HEAD/BRAIN W/O DYE: CPT

## 2023-09-14 PROCEDURE — 85027 COMPLETE CBC AUTOMATED: CPT

## 2023-09-14 PROCEDURE — 85730 THROMBOPLASTIN TIME PARTIAL: CPT

## 2023-09-14 PROCEDURE — 99284 EMERGENCY DEPT VISIT MOD MDM: CPT | Performed by: SURGERY

## 2023-09-14 PROCEDURE — 700117 HCHG RX CONTRAST REV CODE 255: Performed by: SURGERY

## 2023-09-14 PROCEDURE — 36415 COLL VENOUS BLD VENIPUNCTURE: CPT

## 2023-09-14 PROCEDURE — 86900 BLOOD TYPING SEROLOGIC ABO: CPT

## 2023-09-14 PROCEDURE — 82077 ASSAY SPEC XCP UR&BREATH IA: CPT

## 2023-09-14 PROCEDURE — 73706 CT ANGIO LWR EXTR W/O&W/DYE: CPT | Mod: LT

## 2023-09-14 PROCEDURE — 71045 X-RAY EXAM CHEST 1 VIEW: CPT

## 2023-09-14 RX ORDER — CEPHALEXIN 500 MG/1
500 CAPSULE ORAL 4 TIMES DAILY
Qty: 28 CAPSULE | Refills: 0 | Status: ACTIVE | OUTPATIENT
Start: 2023-09-14 | End: 2023-09-18

## 2023-09-14 RX ORDER — CEFAZOLIN 2 G/1
INJECTION, POWDER, FOR SOLUTION INTRAMUSCULAR; INTRAVENOUS
Status: COMPLETED | OUTPATIENT
Start: 2023-09-14 | End: 2023-09-14

## 2023-09-14 RX ADMIN — IOHEXOL 100 ML: 350 INJECTION, SOLUTION INTRAVENOUS at 21:20

## 2023-09-14 RX ADMIN — FENTANYL CITRATE 50 MCG: 50 INJECTION, SOLUTION INTRAMUSCULAR; INTRAVENOUS at 21:02

## 2023-09-14 RX ADMIN — CLOSTRIDIUM TETANI TOXOID ANTIGEN (FORMALDEHYDE INACTIVATED), CORYNEBACTERIUM DIPHTHERIAE TOXOID ANTIGEN (FORMALDEHYDE INACTIVATED), BORDETELLA PERTUSSIS TOXOID ANTIGEN (GLUTARALDEHYDE INACTIVATED), BORDETELLA PERTUSSIS FILAMENTOUS HEMAGGLUTININ ANTIGEN (FORMALDEHYDE INACTIVATED), BORDETELLA PERTUSSIS PERTACTIN ANTIGEN, AND BORDETELLA PERTUSSIS FIMBRIAE 2/3 ANTIGEN 0.5 ML: 5; 2; 2.5; 5; 3; 5 INJECTION, SUSPENSION INTRAMUSCULAR at 21:05

## 2023-09-14 RX ADMIN — CEFAZOLIN 2 G: 2 INJECTION, POWDER, FOR SOLUTION INTRAMUSCULAR; INTRAVENOUS at 21:01

## 2023-09-15 VITALS
HEART RATE: 79 BPM | DIASTOLIC BLOOD PRESSURE: 75 MMHG | BODY MASS INDEX: 30.66 KG/M2 | WEIGHT: 219 LBS | SYSTOLIC BLOOD PRESSURE: 124 MMHG | RESPIRATION RATE: 20 BRPM | HEIGHT: 71 IN | OXYGEN SATURATION: 98 % | TEMPERATURE: 98.9 F

## 2023-09-15 NOTE — ED NOTES
"[place] [employer] called. Advised pt eloped with bilateral IVs in place. Requested to call and follow up with pt.     Reached pt via cell phone. States he is at home. Says \"Sure\" sarcastically when asked if he lives at the address on file. Previously told RN he lives in [place]. Says he couldn't wait any more and left. Says he ripped his IVs out. Denies bleeding. Advised to keep IV sites clean and bandaged. Refuses to come back to ED. Refuses to give address. States he wants no further care.   "
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
## 2023-09-15 NOTE — ED TRIAGE NOTES
"Chief Complaint   Patient presents with    Trauma Red     Pt ambulatory to triage and immediately brought to trauma bay. Pt fell off 20 foot roof during construction. Penetrating injury to L leg above knee. Pt fell onto metal pipes. GCS 15.      PT activated as Trauma Red.    PT from CT to blue 20, pt on monitor, provided call bell and in gown, labs drawn and sent.      BP (!) 159/93   Pulse 98   Temp 37.2 °C (98.9 °F) (Temporal)   Resp (!) 29   Ht 1.803 m (5' 11\")   Wt 99.3 kg (219 lb)   SpO2 91%   BMI 30.54 kg/m²     "
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
## 2023-09-15 NOTE — ED NOTES
Pt standing, getting dressed. Advised we have a urgent situation and will be back to give brace, remove IVS and go over discharge paperwork. Advised not to leave with Ivs in place. Pt agrees.
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
## 2023-09-15 NOTE — ASSESSMENT & PLAN NOTE
Small puncture wound to distal left thigh. No bleeding.  Xray with no radiographic evidence of acute bony injury. Soft tissue gas in the thigh soft tissues.  CTA with no left lower extremity arterial injury identified

## 2023-09-15 NOTE — ASSESSMENT & PLAN NOTE
Slight lateral subluxation of the patella, consider medial collateral ligamentous injury. Follow-up evaluation with MRI of the knee  Small focus of air in the superior medial joint space, could represent component of joint penetration.  Definitive plan pending. knee immobilizer  Weight bearing status - Nonweightbearing LLE.  Darrian Morrison MD. Orthopedic Surgeon. [facility]. consultation pending

## 2023-09-15 NOTE — ED NOTES
Report from RN. Pt provided with wet wash cloths. Self cleans dried blood per request. Provided with Ice pack and additional warm blankets. Call light within reach. Pt is anxious.

## 2023-09-15 NOTE — ED NOTES
Pt ambulatory to triage and immediately brought to trauma bay. Pt fell off 20 foot roof during construction. Penetrating injury to L leg above knee. Pt fell onto metal pipes.

## 2023-09-18 ENCOUNTER — OFFICE VISIT (OUTPATIENT)
Dept: URGENT CARE | Facility: PHYSICIAN GROUP | Age: 43
End: 2023-09-18
Payer: COMMERCIAL

## 2023-09-18 VITALS
OXYGEN SATURATION: 96 % | DIASTOLIC BLOOD PRESSURE: 74 MMHG | HEIGHT: 71 IN | SYSTOLIC BLOOD PRESSURE: 132 MMHG | TEMPERATURE: 97 F | RESPIRATION RATE: 18 BRPM | BODY MASS INDEX: 30.8 KG/M2 | HEART RATE: 72 BPM | WEIGHT: 220 LBS

## 2023-09-18 DIAGNOSIS — T14.8XXA OPEN WOUND: ICD-10-CM

## 2023-09-18 DIAGNOSIS — S63.502A SPRAIN OF LEFT WRIST, INITIAL ENCOUNTER: ICD-10-CM

## 2023-09-18 DIAGNOSIS — S20.211A CONTUSION OF RIGHT CHEST WALL, INITIAL ENCOUNTER: ICD-10-CM

## 2023-09-18 PROCEDURE — 3075F SYST BP GE 130 - 139MM HG: CPT | Performed by: FAMILY MEDICINE

## 2023-09-18 PROCEDURE — 99214 OFFICE O/P EST MOD 30 MIN: CPT | Performed by: FAMILY MEDICINE

## 2023-09-18 PROCEDURE — 3078F DIAST BP <80 MM HG: CPT | Performed by: FAMILY MEDICINE

## 2023-09-18 RX ORDER — HYDROCODONE BITARTRATE AND ACETAMINOPHEN 5; 325 MG/1; MG/1
1 TABLET ORAL EVERY 6 HOURS PRN
Qty: 10 TABLET | Refills: 0 | Status: SHIPPED | OUTPATIENT
Start: 2023-09-18 | End: 2023-09-21

## 2023-09-18 RX ORDER — NAPROXEN 500 MG/1
500 TABLET ORAL 2 TIMES DAILY WITH MEALS
Qty: 20 TABLET | Refills: 0 | Status: SHIPPED | OUTPATIENT
Start: 2023-09-18 | End: 2023-09-28

## 2023-09-18 ASSESSMENT — FIBROSIS 4 INDEX: FIB4 SCORE: 0.82

## 2023-09-18 ASSESSMENT — ENCOUNTER SYMPTOMS
VOMITING: 0
MYALGIAS: 0
EYE REDNESS: 0
EYE DISCHARGE: 0
NAUSEA: 0
WEIGHT LOSS: 0

## 2023-09-18 NOTE — PROGRESS NOTES
"Subjective     Blaise Hyatt is a 42 y.o. male who presents with Fall (X 4 days ago fell of roof. Lft wrist pain, Lft thigh pain, Puncture would Lft thigh, Rt side rib pain.)            4 days right chest wall injury, left thigh wound, left wrist sprain. Fell from second story roof. Evaluated initially in the ER. Chest wall pain is severe. No hemoptysis or SOB. He is tolerating Keflex prophylaxis. No other aggravating or alleviating factors.          Review of Systems   Constitutional:  Negative for malaise/fatigue and weight loss.   Eyes:  Negative for discharge and redness.   Gastrointestinal:  Negative for nausea and vomiting.   Musculoskeletal:  Negative for joint pain and myalgias.   Skin:  Negative for itching and rash.              Objective     /74 (BP Location: Left arm, Patient Position: Sitting, BP Cuff Size: Adult)   Pulse 72   Temp 36.1 °C (97 °F) (Temporal)   Resp 18   Ht 1.803 m (5' 11\")   Wt 99.8 kg (220 lb)   SpO2 96%   BMI 30.68 kg/m²      Physical Exam  Constitutional:       General: He is not in acute distress.     Appearance: He is well-developed.   HENT:      Head: Normocephalic and atraumatic.   Eyes:      Conjunctiva/sclera: Conjunctivae normal.   Cardiovascular:      Rate and Rhythm: Normal rate and regular rhythm.      Heart sounds: Normal heart sounds. No murmur heard.  Pulmonary:      Effort: Pulmonary effort is normal.      Breath sounds: Normal breath sounds. No wheezing.   Chest:      Comments: Tender right chest wall without deformity or crepitus.   Musculoskeletal:        Legs:       Comments: Left wrist: diffusely tender, pain with movement in all planes. Distal neuro/vascular intact.      Skin:     General: Skin is warm and dry.      Findings: No rash.   Neurological:      Mental Status: He is alert.                             Assessment & Plan   Imaging results from 9/14/2023 reviewed including L femur xray, left lower ext CT/CTA, and left wrist xray.    Portable " CXR 9/14/2023 images reviewed by myself, right ribs incompletely visualized.      1. Open wound    - HYDROcodone-acetaminophen (NORCO) 5-325 MG Tab per tablet; Take 1 Tablet by mouth every 6 hours as needed (chest wall pain) for up to 3 days.  Dispense: 10 Tablet; Refill: 0  - naproxen (NAPROSYN) 500 MG Tab; Take 1 Tablet by mouth 2 times a day with meals for 10 days.  Dispense: 20 Tablet; Refill: 0    2. Contusion of right chest wall, initial encounter    - HYDROcodone-acetaminophen (NORCO) 5-325 MG Tab per tablet; Take 1 Tablet by mouth every 6 hours as needed (chest wall pain) for up to 3 days.  Dispense: 10 Tablet; Refill: 0  - naproxen (NAPROSYN) 500 MG Tab; Take 1 Tablet by mouth 2 times a day with meals for 10 days.  Dispense: 20 Tablet; Refill: 0    3. Sprain of left wrist, initial encounter    - HYDROcodone-acetaminophen (NORCO) 5-325 MG Tab per tablet; Take 1 Tablet by mouth every 6 hours as needed (chest wall pain) for up to 3 days.  Dispense: 10 Tablet; Refill: 0  - naproxen (NAPROSYN) 500 MG Tab; Take 1 Tablet by mouth 2 times a day with meals for 10 days.  Dispense: 20 Tablet; Refill: 0    Differential diagnosis, natural history, supportive care, and indications for immediate follow-up were discussed.     Wound irrigated and dressed.     Continue keflex for 5 days.     Work note written.

## 2023-09-18 NOTE — LETTER
September 18, 2023         Patient: Martir Hyatt   YOB: 1980   Date of Visit: 9/18/2023           To Whom it May Concern:    Martir Hyatt was seen in my clinic on 9/18/2023. Please excuse from work 9/18 through 9/22/2023. He may return sooner if improving at faster rate.     Sincerely,           Prasanna Severino M.D.  Electronically Signed

## 2023-09-18 NOTE — PROGRESS NOTES
Subjective     Blaise Hyatt is a 42 y.o. male who presents with No chief complaint on file.            HPI    ROS           Objective     There were no vitals taken for this visit.     Physical Exam                        Assessment & Plan        There are no diagnoses linked to this encounter.

## 2023-09-21 ENCOUNTER — OFFICE VISIT (OUTPATIENT)
Dept: URGENT CARE | Facility: PHYSICIAN GROUP | Age: 43
End: 2023-09-21
Payer: COMMERCIAL

## 2023-09-21 VITALS
DIASTOLIC BLOOD PRESSURE: 80 MMHG | SYSTOLIC BLOOD PRESSURE: 132 MMHG | OXYGEN SATURATION: 97 % | BODY MASS INDEX: 31.5 KG/M2 | HEART RATE: 77 BPM | HEIGHT: 70 IN | RESPIRATION RATE: 16 BRPM | WEIGHT: 220 LBS | TEMPERATURE: 97.9 F

## 2023-09-21 DIAGNOSIS — S20.211D CONTUSION OF RIGHT CHEST WALL, SUBSEQUENT ENCOUNTER: ICD-10-CM

## 2023-09-21 DIAGNOSIS — S71.132D: ICD-10-CM

## 2023-09-21 PROCEDURE — 3075F SYST BP GE 130 - 139MM HG: CPT | Performed by: REGISTERED NURSE

## 2023-09-21 PROCEDURE — 99214 OFFICE O/P EST MOD 30 MIN: CPT | Performed by: REGISTERED NURSE

## 2023-09-21 PROCEDURE — 3079F DIAST BP 80-89 MM HG: CPT | Performed by: REGISTERED NURSE

## 2023-09-21 ASSESSMENT — ENCOUNTER SYMPTOMS
TINGLING: 1
NECK PAIN: 0
WEAKNESS: 0
CHILLS: 0
MYALGIAS: 0
SPUTUM PRODUCTION: 1
FEVER: 0

## 2023-09-21 ASSESSMENT — FIBROSIS 4 INDEX: FIB4 SCORE: 0.82

## 2023-09-21 NOTE — PROGRESS NOTES
"Subjective:   Martir Hyatt is a 42 y.o. male who presents for Medication Problem (Naproxen, hydrocodone, and ibuprofen. Got sick with all of them/Pt states he's taking too much wants to review /)    HPI  Patient is here for reevaluation of his pain medication and injuries. States he is taking 25 ibuprofen, 5 naproxen, and 5 of his 5-325 norco at a time. This is not helping with his pain. Over all his injuries seem to be improving but the pain is not. He went to ER for pain medication and he states he left AMA because they wouldn't treat him. He is stating pain is throbbing and 10/10 and unbearable.  No new numbness.  No change in function or loss of function of the left lower extremity.    Review of Systems   Constitutional:  Negative for chills and fever.   Respiratory:  Positive for sputum production.    Cardiovascular:  Negative for chest pain.   Genitourinary:  Negative for hematuria.   Musculoskeletal:  Negative for myalgias and neck pain.   Neurological:  Positive for tingling. Negative for weakness.       Medications, Allergies, and current problem list reviewed today in Epic.     Objective:     /80   Pulse 77   Temp 36.6 °C (97.9 °F) (Temporal)   Resp 16   Ht 1.778 m (5' 10\")   Wt 99.8 kg (220 lb)   SpO2 97%     Physical Exam  Vitals and nursing note reviewed.   Constitutional:       Appearance: Normal appearance. He is not ill-appearing or toxic-appearing.   HENT:      Mouth/Throat:      Mouth: Mucous membranes are moist.   Eyes:      Pupils: Pupils are equal, round, and reactive to light.   Cardiovascular:      Rate and Rhythm: Normal rate and regular rhythm.      Heart sounds: No murmur heard.  Pulmonary:      Effort: Pulmonary effort is normal. No respiratory distress.      Breath sounds: Normal breath sounds.   Chest:       Abdominal:      General: Abdomen is flat.      Palpations: Abdomen is soft.   Musculoskeletal:         General: Normal range of motion.      Cervical back: Normal " range of motion.        Legs:    Skin:     General: Skin is warm and dry.      Capillary Refill: Capillary refill takes less than 2 seconds.      Findings: No rash.   Neurological:      General: No focal deficit present.      Mental Status: He is alert and oriented to person, place, and time.      Cranial Nerves: No cranial nerve deficit.   Psychiatric:         Mood and Affect: Mood normal.         Assessment/Plan:     Diagnosis and associated orders:     1. Puncture wound of left thigh, subsequent encounter        2. Contusion of right chest wall, subsequent encounter             Comments/MDM:   Differential diagnosis discussed     Pleasant 42-year-old male presenting with worsening pain and failure of previous treatment plan which included Norco and naproxen.  Went to the ER last night and reports they would not treat his pain so he left AMA.  Using crutches to get around.  Does note that the swelling has improved.  But now has a throbbing-like pain in the left thigh where he had a puncture injury.  Is not having any chest pain or shortness of breath, no new numbness or tingling.  Has been taking upwards of 25 ibuprofen and was taking 5 of his 325 [place]'s at a time.  Thankfully vital signs are reassuring.  Patient does appear uncomfortable but talking in full sentences, antalgic gait which she is using crutches, there is bruising and discoloration of the left thigh, no warmth or drainage surrounding injury, TTP.  Also TTP to the right lateral ribs.  No adventitious heart or lung sounds.  Given the patient's level of pain and treatment plan failure I am recommending he go to the emergency department again for reevaluation of worsening symptoms, also discussed urgent care pain clinic as an option if ER is unable to manage pain.  Discussed the importance of not taking more than  recommendation of ibuprofen and also only taking the prescribed amount of Norco and other medications.  Patient verbalized  understanding.  We will likely go to ER and if unable then pain clinic.  Reports not taking any of his pain medication prior to driving.    Return to clinic or go to ED if symptoms worsen or persist. Indications for ED discussed at length. Patient/Parent/Guardian voices understanding. Follow-up with your primary care provider in 3-5 days. Red flag symptoms discussed. All side effects of medication discussed including allergic response, GI upset, tendon injury, rash, sedation etc.    I personally reviewed prior external notes and test results pertinent to today's visit as well as additional imaging and testing completed in clinic today.     Please note that this dictation was created using voice recognition software. I have made every reasonable attempt to correct obvious errors, but I expect that there are errors of grammar and possibly content that I did not discover before finalizing the note.    This note was electronically signed by ALESSANDRO Green

## 2023-09-25 ENCOUNTER — OFFICE VISIT (OUTPATIENT)
Dept: URGENT CARE | Facility: PHYSICIAN GROUP | Age: 43
End: 2023-09-25
Payer: COMMERCIAL

## 2023-09-25 ENCOUNTER — APPOINTMENT (OUTPATIENT)
Dept: URGENT CARE | Facility: PHYSICIAN GROUP | Age: 43
End: 2023-09-25
Payer: COMMERCIAL

## 2023-09-25 VITALS
WEIGHT: 230 LBS | BODY MASS INDEX: 32.2 KG/M2 | DIASTOLIC BLOOD PRESSURE: 82 MMHG | TEMPERATURE: 97.9 F | SYSTOLIC BLOOD PRESSURE: 140 MMHG | OXYGEN SATURATION: 98 % | HEIGHT: 71 IN | RESPIRATION RATE: 16 BRPM | HEART RATE: 78 BPM

## 2023-09-25 DIAGNOSIS — S63.502D SPRAIN OF LEFT WRIST, SUBSEQUENT ENCOUNTER: ICD-10-CM

## 2023-09-25 DIAGNOSIS — R03.0 ELEVATED BLOOD PRESSURE READING IN OFFICE WITHOUT DIAGNOSIS OF HYPERTENSION: ICD-10-CM

## 2023-09-25 DIAGNOSIS — S83.92XA SPRAIN OF LEFT KNEE, UNSPECIFIED LIGAMENT, INITIAL ENCOUNTER: ICD-10-CM

## 2023-09-25 DIAGNOSIS — S71.132D: ICD-10-CM

## 2023-09-25 PROCEDURE — 3077F SYST BP >= 140 MM HG: CPT | Performed by: NURSE PRACTITIONER

## 2023-09-25 PROCEDURE — 3079F DIAST BP 80-89 MM HG: CPT | Performed by: NURSE PRACTITIONER

## 2023-09-25 PROCEDURE — 99213 OFFICE O/P EST LOW 20 MIN: CPT | Performed by: NURSE PRACTITIONER

## 2023-09-25 ASSESSMENT — FIBROSIS 4 INDEX: FIB4 SCORE: 0.82

## 2023-09-25 NOTE — LETTER
September 25, 2023    To Whom It May Concern:         This is confirmation that Martir Hyatt attended his scheduled appointment with ALESSANDRO Serrano on 9/25/23. Martir may return to work tomorrow 9/26/23 please make reasonable workplace accommodations for left knee pain. Resturn to full duty as tolerated.      Sincerely,          IESHA Serrano.  [phone]                   None

## 2023-09-25 NOTE — PROGRESS NOTES
Date: 09/25/23     Chief Complaint:    Chief Complaint   Patient presents with    Letter for School/Work     Work clearance to return to work        History of Present Illness: 42 y.o.  male presents to clinic with healing injuries from a fall.  Patient states he needs a work note to return to work.  He states his symptoms are improving although he still does have some mild swelling and intermittent pain.  He denies any worsening symptoms.  He denies any focal neurological deficits as discussed.  He has been seen and evaluated in the emergency department as well as in this urgent care twice.  He does have a left hinged knee brace as well as a left wrist brace.  He would like to return to work as tolerated.  No fevers or body aches.      ROS:    As stated in HPI     Medical/SX/ Social History:  Reviewed per chart    Pertinent Medications:    Current Outpatient Medications on File Prior to Visit   Medication Sig Dispense Refill    naproxen (NAPROSYN) 500 MG Tab Take 1 Tablet by mouth 2 times a day with meals for 10 days. 20 Tablet 0    naproxen (NAPROSYN) 500 MG Tab NAPROXEN 500 MG TABS      ibuprofen (MOTRIN) 200 MG Tab Take 200 mg by mouth every 6 hours as needed.       No current facility-administered medications on file prior to visit.        Allergies:    Codeine     Problem list, medications, and allergies reviewed by myself today in Epic     Physical Exam:    Vitals:    09/25/23 0834   BP: (!) 140/82   Pulse: 78   Resp: 16   Temp: 36.6 °C (97.9 °F)   SpO2: 98%             Physical Exam  Constitutional:       General: He is not in acute distress.     Appearance: Normal appearance. He is not ill-appearing.   HENT:      Head: Normocephalic and atraumatic.   Musculoskeletal:      Left wrist: Decreased range of motion (Mild decreased range of motion with flexion and extension to resistance.  This does cause some tenderness.). Normal pulse.      Right hand: Normal strength. Normal sensation. There is no disruption of  two-point discrimination. Normal capillary refill. Normal pulse.      Left hand: Normal strength. Normal sensation. There is no disruption of two-point discrimination. Normal capillary refill. Normal pulse.        Legs:    Neurological:      Mental Status: He is alert.            Medical Decision making and plan :  I personally reviewed prior external notes and test results pertinent to today's visit. Pt is clinically stable at today's acute urgent care visit.  Patient appears nontoxic with no acute distress noted. Appropriate for outpatient care at this time. The patient remained stable during the urgent care visit.     Pleasant 42 y.o. male presented clinic with requesting a note to return to work.  Patient has been seen and evaluated in the emergency department underwent CT treated with [facility] for possible internal derangement secondary to puncture wound.  This was 11 days ago.  He has been seen and evaluated for his left wrist x-rays were negative per chart review.  Patient feels ready to return to work.  Did provide a work note to return to work slowly and as tolerated.  Recommend to wear a left hinged knee brace as well as left wrist brace.  Ice as needed Tylenol and ibuprofen.  Shared decision-making was utilized with patient for treatment plan.  Differential Diagnosis, natural history, and supportive care discussed.    1. Sprain of left wrist, subsequent encounter    - Referral to Orthopedics    2. Sprain of left knee, unspecified ligament, initial encounter    - Referral to Orthopedics    3. Puncture wound of left thigh, subsequent encounter      4. Elevated blood pressure reading in office without diagnosis of hypertension    - Referral back to [facility] PCP     Education was provided regarding the aforementioned assessments.  All of the patient's questions were answered to their satisfaction at the time of discharge. Patient was encouraged to monitor symptoms closely. Those signs and symptoms which would  warrant concern and mandate seeking a higher level of service through the emergency department discussed at length.  Patient stated agreement and understanding of this plan of care.    Disposition:  Home in stable condition       Voice Recognition Disclaimer:  Portions of this document were created using voice recognition software. The software does have a chance of producing errors of grammar and possibly content. I have made every reasonable attempt to correct obvious errors, but there may be errors of grammar and possibly content that I did not discover before finalizing the documentation.    IESHA Serrano.

## 2023-09-25 NOTE — LETTER
September 25, 2023    To Whom It May Concern:         This is confirmation that Martir Hyatt attended his scheduled appointment with ALESSANDRO Serrano on 9/25/23. May return to work dull duty 9/26/23.         Sincerely,          IESHA Serrano.  [phone]

## 2023-09-28 ENCOUNTER — TELEPHONE (OUTPATIENT)
Dept: HEALTH INFORMATION MANAGEMENT | Facility: OTHER | Age: 43
End: 2023-09-28
Payer: COMMERCIAL

## 2024-04-17 ENCOUNTER — OFFICE VISIT (OUTPATIENT)
Dept: URGENT CARE | Facility: CLINIC | Age: 44
End: 2024-04-17
Payer: COMMERCIAL

## 2024-04-17 VITALS
DIASTOLIC BLOOD PRESSURE: 70 MMHG | SYSTOLIC BLOOD PRESSURE: 122 MMHG | TEMPERATURE: 98.6 F | RESPIRATION RATE: 16 BRPM | WEIGHT: 220 LBS | OXYGEN SATURATION: 93 % | HEART RATE: 81 BPM | HEIGHT: 71 IN | BODY MASS INDEX: 30.8 KG/M2

## 2024-04-17 DIAGNOSIS — R59.1 LYMPHADENOPATHY: ICD-10-CM

## 2024-04-17 DIAGNOSIS — F41.9 ANXIETY: ICD-10-CM

## 2024-04-17 DIAGNOSIS — K02.9 DENTAL CARIES: ICD-10-CM

## 2024-04-17 PROCEDURE — 3078F DIAST BP <80 MM HG: CPT | Performed by: PHYSICIAN ASSISTANT

## 2024-04-17 PROCEDURE — 3074F SYST BP LT 130 MM HG: CPT | Performed by: PHYSICIAN ASSISTANT

## 2024-04-17 PROCEDURE — 99213 OFFICE O/P EST LOW 20 MIN: CPT | Performed by: PHYSICIAN ASSISTANT

## 2024-04-17 RX ORDER — SUCRALFATE 1 G/1
TABLET ORAL
COMMUNITY
Start: 2019-06-13

## 2024-04-17 RX ORDER — AMOXICILLIN AND CLAVULANATE POTASSIUM 875; 125 MG/1; MG/1
1 TABLET, FILM COATED ORAL 2 TIMES DAILY
Qty: 20 TABLET | Refills: 0 | Status: SHIPPED | OUTPATIENT
Start: 2024-04-17

## 2024-04-17 ASSESSMENT — ENCOUNTER SYMPTOMS
COUGH: 0
FEVER: 0
SORE THROAT: 0
SHORTNESS OF BREATH: 0
WHEEZING: 0
NERVOUS/ANXIOUS: 1
CHILLS: 0
VOMITING: 0
SPUTUM PRODUCTION: 0
NAUSEA: 0

## 2024-04-17 ASSESSMENT — FIBROSIS 4 INDEX: FIB4 SCORE: 0.83

## 2024-04-17 NOTE — PROGRESS NOTES
"Subjective:   Martir Hyatt  is a 43 y.o. male who presents for Ear Pain (Lymph node on right side of face by ear is hurting x 3 days)      Other  This is a new problem. The current episode started in the past 7 days. Pertinent negatives include no chills, congestion, coughing, fever, nausea, rash, sore throat or vomiting.   Patient presents to urgent care with sensation of swelling to lymph nodes on right side times last 3 days.  Patient denies otherwise feeling significantly ill.  Denies fevers or chills.  Denies cough or sore throat.  He does have some discomfort adjacent to right ear but denies deeper ear pain.  Patient  has significant dental caries but denies any areas of focal pain Or Swelling.  Patient has a significant history of anxiety as well.  He expresses interest in following up with behavioral medicine.  He notes a history of anxiety and depression and would like to be evaluated once again.  He denies suicidal ideation or homicidal ideation at this time.  Patient denies cough or abnormal breathing.  His oxygen's slightly down to 93%.  Patient states he does smoke cannabis which may be contributory.       Review of Systems   Constitutional:  Negative for chills and fever.   HENT:  Negative for congestion, ear pain and sore throat.    Respiratory:  Negative for cough, sputum production, shortness of breath and wheezing.    Gastrointestinal:  Negative for nausea and vomiting.   Skin:  Negative for rash.   Psychiatric/Behavioral:  Negative for suicidal ideas. The patient is nervous/anxious.        Allergies   Allergen Reactions    Codeine         Objective:   /70 (BP Location: Left arm, Patient Position: Sitting, BP Cuff Size: Adult)   Pulse 81   Temp 37 °C (98.6 °F) (Temporal)   Resp 16   Ht 1.803 m (5' 11\")   Wt 99.8 kg (220 lb)   SpO2 93%   BMI 30.68 kg/m²     Physical Exam  Vitals and nursing note reviewed.   Constitutional:       General: He is not in acute distress.     Appearance: " Normal appearance. He is well-developed. He is not diaphoretic.   HENT:      Head: Normocephalic and atraumatic.      Right Ear: Tympanic membrane, ear canal and external ear normal.      Left Ear: Tympanic membrane, ear canal and external ear normal.      Nose: Nose normal.      Mouth/Throat:      Lips: Pink.      Mouth: Mucous membranes are moist.      Dentition: Abnormal dentition (significant caries). Does not have dentures. Gingival swelling and dental caries present. No dental tenderness or dental abscesses.      Pharynx: Uvula midline. Posterior oropharyngeal erythema ( mild PND) present. No oropharyngeal exudate.      Tonsils: No tonsillar abscesses.   Eyes:      General: No scleral icterus.        Right eye: No discharge.         Left eye: No discharge.      Conjunctiva/sclera: Conjunctivae normal.   Pulmonary:      Effort: Pulmonary effort is normal. No respiratory distress.      Breath sounds: Normal breath sounds. No stridor. No decreased breath sounds, wheezing, rhonchi or rales.   Musculoskeletal:         General: Normal range of motion.      Cervical back: Neck supple.   Lymphadenopathy:      Head:      Right side of head: Preauricular adenopathy present.      Left side of head: No preauricular adenopathy.      Cervical: Cervical adenopathy present.      Comments: Two areas of mild lymphadenopathy on right   Skin:     General: Skin is warm and dry.      Coloration: Skin is not pale.   Neurological:      Mental Status: He is alert and oriented to person, place, and time.      Coordination: Coordination normal.         Assessment/Plan:   1. Lymphadenopathy  - amoxicillin-clavulanate (AUGMENTIN) 875-125 MG Tab; Take 1 Tablet by mouth 2 times a day.  Dispense: 20 Tablet; Refill: 0  - Referral to establish with PCP    2. Dental caries  - amoxicillin-clavulanate (AUGMENTIN) 875-125 MG Tab; Take 1 Tablet by mouth 2 times a day.  Dispense: 20 Tablet; Refill: 0  - Referral to establish with PCP    3.  Anxiety  - Referral to Behavioral Health  - Referral to establish with PCP    Other orders  - sucralfate (CARAFATE) 1 GM Tab; SUCRALFATE 1 GM TABS  Supportive care is reviewed with patient/caregiver - recommend to push PO fluids and electrolytes,  take full course of Rx, take with probiotics, observe for resolution  Return to clinic with lack of resolution or progression of symptoms.    Recommend follow-up with dentist.  Recommend follow-up with PCP, referral placed today.  Recommend follow-up with behavioral health, referral placed today    I have worn an N95 mask, gloves and eye protection for the entire encounter with this patient.     Differential diagnosis, natural history, supportive care, and indications for immediate follow-up discussed.

## 2024-04-22 ENCOUNTER — OFFICE VISIT (OUTPATIENT)
Dept: URGENT CARE | Facility: CLINIC | Age: 44
End: 2024-04-22
Payer: COMMERCIAL

## 2024-04-22 ENCOUNTER — APPOINTMENT (OUTPATIENT)
Dept: RADIOLOGY | Facility: IMAGING CENTER | Age: 44
End: 2024-04-22
Payer: COMMERCIAL

## 2024-04-22 VITALS
RESPIRATION RATE: 18 BRPM | OXYGEN SATURATION: 99 % | WEIGHT: 220.8 LBS | HEIGHT: 71 IN | TEMPERATURE: 98.1 F | HEART RATE: 77 BPM | SYSTOLIC BLOOD PRESSURE: 130 MMHG | DIASTOLIC BLOOD PRESSURE: 84 MMHG | BODY MASS INDEX: 30.91 KG/M2

## 2024-04-22 DIAGNOSIS — M25.552 BILATERAL HIP PAIN: ICD-10-CM

## 2024-04-22 DIAGNOSIS — M25.859 FEMORAL ACETABULAR IMPINGEMENT: ICD-10-CM

## 2024-04-22 DIAGNOSIS — Z76.89 ENCOUNTER TO ESTABLISH CARE WITH NEW DOCTOR: ICD-10-CM

## 2024-04-22 DIAGNOSIS — M25.551 BILATERAL HIP PAIN: ICD-10-CM

## 2024-04-22 PROCEDURE — 3075F SYST BP GE 130 - 139MM HG: CPT

## 2024-04-22 PROCEDURE — 3079F DIAST BP 80-89 MM HG: CPT

## 2024-04-22 PROCEDURE — 73521 X-RAY EXAM HIPS BI 2 VIEWS: CPT | Mod: TC | Performed by: RADIOLOGY

## 2024-04-22 PROCEDURE — 99214 OFFICE O/P EST MOD 30 MIN: CPT

## 2024-04-22 RX ORDER — KETOROLAC TROMETHAMINE 30 MG/ML
15 INJECTION, SOLUTION INTRAMUSCULAR; INTRAVENOUS ONCE
Status: COMPLETED | OUTPATIENT
Start: 2024-04-22 | End: 2024-04-22

## 2024-04-22 RX ADMIN — KETOROLAC TROMETHAMINE 15 MG: 30 INJECTION, SOLUTION INTRAMUSCULAR; INTRAVENOUS at 09:31

## 2024-04-22 ASSESSMENT — FIBROSIS 4 INDEX: FIB4 SCORE: 0.83

## 2024-04-22 NOTE — PROGRESS NOTES
"Chief Complaint   Patient presents with    Pain     X2days bilateral hip throbbing pain/         Subjective:   HISTORY OF PRESENT ILLNESS: Martir Hyatt is a 43 y.o. male who presents for bilateral hip pain R>L.  Has been dealing with this for over a year but recently has been causing more pain nad now is limping. Also reports some inflammation and tightness in his bilateral hands.    Patient denies N/T in his LE.  There has been no recent trauma or fevers.     Medications, Allergies, current problem list, Social and Family history reviewed today in Epic.     Objective:     /84   Pulse 77   Temp 36.7 °C (98.1 °F) (Temporal)   Resp 18   Ht 1.803 m (5' 11\")   Wt 100 kg (220 lb 12.8 oz)   SpO2 99%     Physical Exam  Vitals reviewed.   Constitutional:       Appearance: Normal appearance.   HENT:      Mouth/Throat:      Mouth: Mucous membranes are moist.   Cardiovascular:      Rate and Rhythm: Normal rate.   Pulmonary:      Effort: Pulmonary effort is normal.   Musculoskeletal:      Comments: Generalized pain with movement in hip joints, no edema or erythema noted, no bulging in the groin   Skin:     General: Skin is warm and dry.   Neurological:      Mental Status: He is alert and oriented to person, place, and time.   Psychiatric:         Mood and Affect: Mood normal.          Assessment/Plan:     Diagnosis and associated orders    I personally reviewed prior external notes and test results pertinent to today's visit.     1. Bilateral hip pain  DX-HIP-BILATERAL-WITH PELVIS-2 VIEWS    ketorolac (Toradol) injection 15 mg    Referral to Sports Medicine      2. Encounter to establish care with new doctor        3. Femoral acetabular impingement  DX-HIP-BILATERAL-WITH PELVIS-2 VIEWS    ketorolac (Toradol) injection 15 mg    Referral to Sports Medicine        Today's radiology imaging personally reviewed by me today on day of visit and Radiology readings reviewed and discussed w/ patient today.     RADIOLOGY " RESULTS   DX-HIP-BILATERAL-WITH PELVIS-2 VIEWS    Result Date: 4/22/2024 4/22/2024 9:17 AM HISTORY/REASON FOR EXAM:  Chronic bilateral hip pain. TECHNIQUE/EXAM DESCRIPTION AND NUMBER OF VIEWS:  3 views of the bilateral hips. COMPARISON: LEFT femur 9/14/2023 FINDINGS: Pelvis and sacrum are intact. SI joints are symmetric and within normal limits. Visualized proximal femurs are intact and normally located.  No gross soft tissue abnormality.  Bony prominence seen at the lateral aspect of the femoral neck on both sides.     1.  No hip or pelvic fracture. 2.  Bony prominence at the lateral femoral neck on both sides compatible with femoroacetabular impingement in the appropriate clinical setting.              IMPRESSION:  Pt has stable vital signs and no red flag symptoms or exam findings identified.   Xrays show femoroacetabular impingement. Have advised antiinflammatories and ref to sports med    Differential diagnosis discussed. Pt was Educated on red flag symptoms. Pt has been Instructed to return to Urgent Care or nearest Emergency Department if symptoms fail to improve, for any change in condition, further concerns, or new concerning symptoms. Patient states understanding of the plan of care and discharge instructions.  They are discharged in stable condition.         Please note that this dictation was created using voice recognition software. I have made a reasonable attempt to correct obvious errors, but I expect that there are errors of grammar and possibly content that I did not discover before finalizing the note.    This note was electronically signed by ALESSANDRO Calle

## 2025-02-26 ENCOUNTER — OFFICE VISIT (OUTPATIENT)
Dept: MEDICAL GROUP | Age: 45
End: 2025-02-26
Payer: COMMERCIAL

## 2025-02-26 VITALS
HEIGHT: 71 IN | OXYGEN SATURATION: 95 % | SYSTOLIC BLOOD PRESSURE: 122 MMHG | HEART RATE: 84 BPM | BODY MASS INDEX: 32.2 KG/M2 | TEMPERATURE: 99 F | WEIGHT: 230 LBS | DIASTOLIC BLOOD PRESSURE: 86 MMHG

## 2025-02-26 DIAGNOSIS — Z12.11 COLON CANCER SCREENING: ICD-10-CM

## 2025-02-26 DIAGNOSIS — M25.512 ACUTE PAIN OF LEFT SHOULDER: ICD-10-CM

## 2025-02-26 DIAGNOSIS — F31.60 BIPOLAR AFFECTIVE, MIXED (HCC): ICD-10-CM

## 2025-02-26 DIAGNOSIS — R20.0 BILATERAL HAND NUMBNESS: ICD-10-CM

## 2025-02-26 DIAGNOSIS — Z00.00 WELLNESS EXAMINATION: ICD-10-CM

## 2025-02-26 DIAGNOSIS — E66.9 OBESITY (BMI 30-39.9): ICD-10-CM

## 2025-02-26 DIAGNOSIS — Z11.4 SCREENING FOR HIV (HUMAN IMMUNODEFICIENCY VIRUS): ICD-10-CM

## 2025-02-26 DIAGNOSIS — Z11.59 NEED FOR HEPATITIS C SCREENING TEST: ICD-10-CM

## 2025-02-26 PROBLEM — S86.902A: Status: RESOLVED | Noted: 2019-08-19 | Resolved: 2025-02-26

## 2025-02-26 PROBLEM — R00.0 RACING HEART BEAT: Status: RESOLVED | Noted: 2020-01-22 | Resolved: 2025-02-26

## 2025-02-26 PROBLEM — S71.132A: Status: RESOLVED | Noted: 2023-09-14 | Resolved: 2025-02-26

## 2025-02-26 PROBLEM — S00.81XA FACIAL ABRASION, INITIAL ENCOUNTER: Status: RESOLVED | Noted: 2023-09-14 | Resolved: 2025-02-26

## 2025-02-26 PROBLEM — S83.002A PATELLAR SUBLUXATION, LEFT, INITIAL ENCOUNTER: Status: RESOLVED | Noted: 2023-09-14 | Resolved: 2025-02-26

## 2025-02-26 PROBLEM — S02.2XXA CLOSED FRACTURE OF NASAL BONE: Status: RESOLVED | Noted: 2023-09-14 | Resolved: 2025-02-26

## 2025-02-26 PROBLEM — T14.90XA TRAUMA: Status: RESOLVED | Noted: 2023-09-14 | Resolved: 2025-02-26

## 2025-02-26 ASSESSMENT — FIBROSIS 4 INDEX: FIB4 SCORE: 0.85

## 2025-02-26 NOTE — PROGRESS NOTES
Verbal consent was acquired by the patient to use [employer] ambient listening note generation during this visit     Subjective:     HPI:   History of Present Illness  The patient is a 44-year-old male who presents to [facility].    He reports experiencing migratory deep pain in various body parts, which has become unresponsive to ibuprofen. He has a history of hip pain, for which he sought urgent care months ago and received an injection that provided relief. An x-ray revealed hip misalignment, but he questions the intermittent nature of this condition. He also experiences shoulder pain, particularly when using power tools, requiring him to elevate his elbow for tool use. Additionally, he reports sudden back spasms that immobilize him and cause breathlessness. He has a long-standing history of knee issues, including dislocations and blood accumulation, leading to periods of immobility. Despite recommendations for ACL evaluation, he opted for a hinged brace and self-rehabilitation. He has been managing his knee condition without medical intervention. He has been experiencing shoulder pain for the past 2 months and is considering physical therapy or steroid injections. He has been using lidocaine patches for acute shoulder pain, which he finds effective. He engages in strength training with his son and works as a , which involves heavy lifting. [place]. He has no history of major surgeries. He has been taking 12 tablets of ibuprofen 200 mg daily for pain management, which he finds effective.    He has a history of panic attacks, which have subsided, but he continues to struggle with depression and bipolar disorder, which he manages well. He has not experienced panic or anxiety attacks for several years. He acknowledges that marijuana can induce anxiety but finds it beneficial for sleep and daily functioning. He reports difficulty maintaining  "attention in conversations and restlessness.    He has a family history of arthritis and expresses concern about potential wear and tear on his body. He has a history of playing guitar and experiences carpal tunnel-like symptoms, which he attributes to his musical activities. He reports morning hand pain and numbness, which he manages by elevating his arms above his shoulders. He also experiences bone pain and numbness in both hands. He has wrist braces from a previous fall from a roof 2 years ago, which resulted in wrist pain for 2 to 3 weeks.    He expresses interest in undergoing a colonoscopy and other age-appropriate screenings. He has a history of tattoos and is due for a hepatitis C test. He has lost 50 pounds, which has improved his blood pressure and mental health. He reports frequent nausea, which he attributes to poor diet and hydration.    SOCIAL HISTORY  The patient has not smoked cigarettes for 20 years. He admits to smoking marijuana.    FAMILY HISTORY  The patient's mother had ovarian cancer at 55. The patient's father  of alcoholism.    MEDICATIONS  Current: ibuprofen  Past: rosuvastatin    ROS  No n/v/d/c, fever, chills, sob, chest pain. Lt shoulder pain with raising arms above head      Objective:     Exam:  /86 (BP Location: Left arm, Patient Position: Sitting, BP Cuff Size: Adult)   Pulse 84   Temp 37.2 °C (99 °F) (Temporal)   Ht 1.803 m (5' 11\")   Wt 104 kg (230 lb)   SpO2 95%   BMI 32.08 kg/m²  Body mass index is 32.08 kg/m².    Physical Exam    Gen: nad  HENT: ncat, EOMI  Resp: ctabl  Cardiac: rrr, no m  GI: nt/nd  MSK: Phalen's test positive, limited by pain flexion, abduction and extension of Lt shoulder wo ttp over the joint.   Neuro: cn 2-12 intact throughout, sensation to light touch intact throughout  Psych: appropriate mood and affect      Results      Assessment & Plan:     1. Bipolar affective, mixed (HCC)        2. Obesity (BMI 30-39.9)        3. Bilateral hand " numbness        4. Acute pain of left shoulder  Referral to Physical Therapy    Referral to Sports Medicine    DX-SHOULDER 2+ LEFT      5. Wellness examination  CBC WITHOUT DIFFERENTIAL    Comp Metabolic Panel    HEMOGLOBIN A1C    Lipid Profile    VITAMIN D,25 HYDROXY (DEFICIENCY)    TSH WITH REFLEX TO FT4      6. Colon cancer screening  Referral to GI for Colonoscopy      7. Screening for HIV (human immunodeficiency virus)  HIV AG/AB COMBO ASSAY SCREENING      8. Need for hepatitis C screening test  HEP C VIRUS ANTIBODY          Assessment & Plan  1. Establishment of care.  His symptoms suggest a potential diagnosis of carpal tunnel syndrome, while the discomfort in his larger joints appears to be a result of wear and tear. His cholesterol levels were previously elevated, but he has since lost 50 pounds, which has positively impacted his blood pressure and mental health. He is advised to engage in physical therapy and stretching exercises for all his joints, and to incorporate strength training into his routine. He is also encouraged to maintain a healthy diet and exercise regularly. A referral for physical therapy will be initiated. He is advised to take ibuprofen 600 mg a few times a day when the pain is severe, and to add Tylenol 1000 mg each time he takes ibuprofen. He is also advised to try naproxen and Tylenol, but not to combine naproxen and ibuprofen. A prescription for a lidocaine patch will be provided for acute shoulder pain. He is advised to wear wrist splints at night. A referral to sports medicine will be made, where they may order an MRI or administer a steroid injection. An x-ray of his left shoulder will be ordered. A referral for a colonoscopy will be made. He is advised to schedule his labs 2 weeks prior to his next appointment.    2. Carpal Tunnel Syndrome.  His symptoms, including pain and numbness in both hands, especially at night, suggest carpal tunnel syndrome. He is advised to wear wrist  splints at night to keep his hands straight. If symptoms worsen, he can return for further testing, including a nerve conduction test. Pt declined EMG for now.    3. Lt shoulder pain  He reports pain in his rotator cuff, exacerbated by certain motions and activities. Physical therapy is recommended as the first line of treatment. A referral to sports medicine will be made, where they may decide to order an MRI or administer a steroid injection. An x-ray of his left shoulder will also be ordered.    4. Health Maintenance.  A referral for a colonoscopy will be made. He is advised to schedule his labs 2 weeks prior to his next appointment. Given his history of tattoos, screening for Hepatitis C and HIV will be conducted.    5. Prediabetes  6. HLD  Pt has lost a lot of wt since   Repeat labs ordered     Follow-up  The patient will follow up in 3 months for an annual visit.          Return for annual visit, lab results.    Please note that this dictation was created using voice recognition software. I have made every reasonable attempt to correct obvious errors, but I expect that there are errors of grammar and possibly content that I did not discover before finalizing the note.

## 2025-02-28 NOTE — Clinical Note
REFERRAL APPROVAL NOTICE         Sent on February 28, 2025                   Blaise Oliverastrom  [address] [place] [address]                   Dear Mr. Hyatt,    After a careful review of the medical information and benefit coverage, [facility] has processed your referral. See below for additional details.    If applicable, you must be actively enrolled with your insurance for coverage of the authorized service. If you have any questions regarding your coverage, please contact your insurance directly.    REFERRAL INFORMATION   Referral #:  [id number]  Referred-To Provider    Referred-By Provider:  Gastroenterology    Aj Huber M.D.   [facility]      [address] [place] [id number] [phone] [address] [place] [address]  [place] [address]  [phone]    Referral Start Date:  02/26/2025  Referral End Date:   02/26/2026             SCHEDULING  If you do not already have an appointment, please call [phone] to make an appointment.     MORE INFORMATION  If you do not already have a [employer] account, sign up at: [email or web address]  You can access your medical information, make appointments, see lab results, billing information, and more.  If you have questions regarding this referral, please contact  the [facility] Referrals department at:             [phone]. Monday - Friday 8:00AM - 5:00PM.     Sincerely,    [facility]

## 2025-03-01 NOTE — Clinical Note
REFERRAL APPROVAL NOTICE         Sent on February 28, 2025                   Blaise Oliverastrom  [address] [place] [address]                   Dear Mr. Hyatt,    After a careful review of the medical information and benefit coverage, [facility] has processed your referral. See below for additional details.    If applicable, you must be actively enrolled with your insurance for coverage of the authorized service. If you have any questions regarding your coverage, please contact your insurance directly.    REFERRAL INFORMATION   Referral #:  [id number]  Referred-To Department    Referred-By Provider:  Sports Medicine    Aj Huber M.D.   [facility] [place] [address]  [phone] [address]  [place] [id number] [phone]    Referral Start Date:  02/26/2025  Referral End Date:   02/26/2026             SCHEDULING  If you do not already have an appointment, please call [phone] to make an appointment.     MORE INFORMATION  If you do not already have a [employer] account, sign up at: [email or web address]  You can access your medical information, make appointments, see lab results, billing information, and more.  If you have questions regarding this referral, please contact  the [facility] Referrals department at:             [phone]. Monday - Friday 8:00AM - 5:00PM.     Sincerely,    [facility]

## 2025-03-01 NOTE — Clinical Note
REFERRAL APPROVAL NOTICE         Sent on February 28, 2025                   Blaise Oliverastrom  [address] [place] [address]                   Dear Mr. Hyatt,    After a careful review of the medical information and benefit coverage, [facility] has processed your referral. See below for additional details.    If applicable, you must be actively enrolled with your insurance for coverage of the authorized service. If you have any questions regarding your coverage, please contact your insurance directly.    REFERRAL INFORMATION   Referral #:  [id number]  Referred-To Department    Referred-By Provider:  Physical Therapy    Aj Huber M.D.   [facility] [place] [address]  [phone] [address] [place] [address], [address]  [place] [address]  [phone]    Referral Start Date:  02/26/2025  Referral End Date:   02/26/2026             SCHEDULING  If you do not already have an appointment, please call [phone] to make an appointment.     MORE INFORMATION  If you do not already have a [employer] account, sign up at: [email or web address]  You can access your medical information, make appointments, see lab results, billing information, and more.  If you have questions regarding this referral, please contact  the [facility] Referrals department at:             [phone]. Monday - Friday 8:00AM - 5:00PM.     Sincerely,    [facility]

## 2025-06-02 ENCOUNTER — APPOINTMENT (OUTPATIENT)
Dept: MEDICAL GROUP | Age: 45
End: 2025-06-02
Payer: COMMERCIAL

## 2025-06-05 ENCOUNTER — APPOINTMENT (OUTPATIENT)
Dept: RADIOLOGY | Facility: MEDICAL CENTER | Age: 45
End: 2025-06-05
Attending: EMERGENCY MEDICINE
Payer: COMMERCIAL

## 2025-06-05 ENCOUNTER — HOSPITAL ENCOUNTER (EMERGENCY)
Facility: MEDICAL CENTER | Age: 45
End: 2025-06-05
Attending: EMERGENCY MEDICINE
Payer: COMMERCIAL

## 2025-06-05 VITALS
RESPIRATION RATE: 20 BRPM | OXYGEN SATURATION: 90 % | SYSTOLIC BLOOD PRESSURE: 124 MMHG | HEIGHT: 71 IN | TEMPERATURE: 98 F | WEIGHT: 225 LBS | DIASTOLIC BLOOD PRESSURE: 78 MMHG | BODY MASS INDEX: 31.5 KG/M2 | HEART RATE: 81 BPM

## 2025-06-05 DIAGNOSIS — S22.029A CLOSED FRACTURE OF SECOND THORACIC VERTEBRA, UNSPECIFIED FRACTURE MORPHOLOGY, INITIAL ENCOUNTER (HCC): Primary | ICD-10-CM

## 2025-06-05 DIAGNOSIS — S22.059A CLOSED FRACTURE OF FIFTH THORACIC VERTEBRA, UNSPECIFIED FRACTURE MORPHOLOGY, INITIAL ENCOUNTER (HCC): ICD-10-CM

## 2025-06-05 DIAGNOSIS — S82.042B: ICD-10-CM

## 2025-06-05 LAB
ABO GROUP BLD: NORMAL
ALBUMIN SERPL BCP-MCNC: 4.5 G/DL (ref 3.2–4.9)
ALBUMIN/GLOB SERPL: 1.7 G/DL
ALP SERPL-CCNC: 54 U/L (ref 30–99)
ALT SERPL-CCNC: 22 U/L (ref 2–50)
ANION GAP SERPL CALC-SCNC: 12 MMOL/L (ref 7–16)
APTT PPP: 26.2 SEC (ref 24.7–36)
AST SERPL-CCNC: 30 U/L (ref 12–45)
BASOPHILS # BLD AUTO: 0.5 % (ref 0–1.8)
BASOPHILS # BLD: 0.06 K/UL (ref 0–0.12)
BILIRUB SERPL-MCNC: 0.8 MG/DL (ref 0.1–1.5)
BLD GP AB SCN SERPL QL: NORMAL
BUN SERPL-MCNC: 15 MG/DL (ref 8–22)
CALCIUM ALBUM COR SERPL-MCNC: 9 MG/DL (ref 8.5–10.5)
CALCIUM SERPL-MCNC: 9.4 MG/DL (ref 8.5–10.5)
CHLORIDE SERPL-SCNC: 102 MMOL/L (ref 96–112)
CO2 SERPL-SCNC: 23 MMOL/L (ref 20–33)
CREAT SERPL-MCNC: 1.05 MG/DL (ref 0.5–1.4)
EKG IMPRESSION: NORMAL
EOSINOPHIL # BLD AUTO: 0.13 K/UL (ref 0–0.51)
EOSINOPHIL NFR BLD: 1.1 % (ref 0–6.9)
ERYTHROCYTE [DISTWIDTH] IN BLOOD BY AUTOMATED COUNT: 43.3 FL (ref 35.9–50)
GFR SERPLBLD CREATININE-BSD FMLA CKD-EPI: 88 ML/MIN/1.73 M 2
GLOBULIN SER CALC-MCNC: 2.6 G/DL (ref 1.9–3.5)
GLUCOSE SERPL-MCNC: 113 MG/DL (ref 65–99)
HCT VFR BLD AUTO: 42.6 % (ref 42–52)
HGB BLD-MCNC: 14.3 G/DL (ref 14–18)
IMM GRANULOCYTES # BLD AUTO: 0.15 K/UL (ref 0–0.11)
IMM GRANULOCYTES NFR BLD AUTO: 1.3 % (ref 0–0.9)
INR PPP: 1.02 (ref 0.87–1.13)
LIPASE SERPL-CCNC: 50 U/L (ref 11–82)
LYMPHOCYTES # BLD AUTO: 1.95 K/UL (ref 1–4.8)
LYMPHOCYTES NFR BLD: 16.6 % (ref 22–41)
MCH RBC QN AUTO: 31.3 PG (ref 27–33)
MCHC RBC AUTO-ENTMCNC: 33.6 G/DL (ref 32.3–36.5)
MCV RBC AUTO: 93.2 FL (ref 81.4–97.8)
MONOCYTES # BLD AUTO: 0.42 K/UL (ref 0–0.85)
MONOCYTES NFR BLD AUTO: 3.6 % (ref 0–13.4)
NEUTROPHILS # BLD AUTO: 9.01 K/UL (ref 1.82–7.42)
NEUTROPHILS NFR BLD: 76.9 % (ref 44–72)
NRBC # BLD AUTO: 0 K/UL
NRBC BLD-RTO: 0 /100 WBC (ref 0–0.2)
PLATELET # BLD AUTO: 219 K/UL (ref 164–446)
PMV BLD AUTO: 9.9 FL (ref 9–12.9)
POTASSIUM SERPL-SCNC: 4.3 MMOL/L (ref 3.6–5.5)
PROT SERPL-MCNC: 7.1 G/DL (ref 6–8.2)
PROTHROMBIN TIME: 13.8 SEC (ref 12–14.6)
RBC # BLD AUTO: 4.57 M/UL (ref 4.7–6.1)
RH BLD: NORMAL
SODIUM SERPL-SCNC: 137 MMOL/L (ref 135–145)
TROPONIN T SERPL-MCNC: <6 NG/L (ref 6–19)
WBC # BLD AUTO: 11.7 K/UL (ref 4.8–10.8)

## 2025-06-05 PROCEDURE — 71045 X-RAY EXAM CHEST 1 VIEW: CPT

## 2025-06-05 PROCEDURE — 86901 BLOOD TYPING SEROLOGIC RH(D): CPT

## 2025-06-05 PROCEDURE — 73562 X-RAY EXAM OF KNEE 3: CPT | Mod: LT

## 2025-06-05 PROCEDURE — 700101 HCHG RX REV CODE 250: Performed by: EMERGENCY MEDICINE

## 2025-06-05 PROCEDURE — 85610 PROTHROMBIN TIME: CPT

## 2025-06-05 PROCEDURE — 70450 CT HEAD/BRAIN W/O DYE: CPT

## 2025-06-05 PROCEDURE — 83690 ASSAY OF LIPASE: CPT

## 2025-06-05 PROCEDURE — 96375 TX/PRO/DX INJ NEW DRUG ADDON: CPT

## 2025-06-05 PROCEDURE — 96374 THER/PROPH/DIAG INJ IV PUSH: CPT

## 2025-06-05 PROCEDURE — 93005 ELECTROCARDIOGRAM TRACING: CPT | Mod: TC | Performed by: EMERGENCY MEDICINE

## 2025-06-05 PROCEDURE — 80053 COMPREHEN METABOLIC PANEL: CPT

## 2025-06-05 PROCEDURE — 72125 CT NECK SPINE W/O DYE: CPT

## 2025-06-05 PROCEDURE — 84484 ASSAY OF TROPONIN QUANT: CPT

## 2025-06-05 PROCEDURE — 94760 N-INVAS EAR/PLS OXIMETRY 1: CPT

## 2025-06-05 PROCEDURE — 73080 X-RAY EXAM OF ELBOW: CPT | Mod: RT

## 2025-06-05 PROCEDURE — 73552 X-RAY EXAM OF FEMUR 2/>: CPT | Mod: LT

## 2025-06-05 PROCEDURE — 86850 RBC ANTIBODY SCREEN: CPT

## 2025-06-05 PROCEDURE — 72070 X-RAY EXAM THORAC SPINE 2VWS: CPT

## 2025-06-05 PROCEDURE — 72170 X-RAY EXAM OF PELVIS: CPT

## 2025-06-05 PROCEDURE — 73590 X-RAY EXAM OF LOWER LEG: CPT | Mod: LT

## 2025-06-05 PROCEDURE — 85730 THROMBOPLASTIN TIME PARTIAL: CPT

## 2025-06-05 PROCEDURE — 99285 EMERGENCY DEPT VISIT HI MDM: CPT

## 2025-06-05 PROCEDURE — 86900 BLOOD TYPING SEROLOGIC ABO: CPT

## 2025-06-05 PROCEDURE — 36415 COLL VENOUS BLD VENIPUNCTURE: CPT

## 2025-06-05 PROCEDURE — 700111 HCHG RX REV CODE 636 W/ 250 OVERRIDE (IP): Performed by: EMERGENCY MEDICINE

## 2025-06-05 PROCEDURE — 72131 CT LUMBAR SPINE W/O DYE: CPT

## 2025-06-05 PROCEDURE — 700117 HCHG RX CONTRAST REV CODE 255: Performed by: EMERGENCY MEDICINE

## 2025-06-05 PROCEDURE — 71260 CT THORAX DX C+: CPT

## 2025-06-05 PROCEDURE — 72128 CT CHEST SPINE W/O DYE: CPT

## 2025-06-05 PROCEDURE — 85025 COMPLETE CBC W/AUTO DIFF WBC: CPT

## 2025-06-05 RX ORDER — CEFAZOLIN 2 G/1
2 INJECTION, POWDER, FOR SOLUTION INTRAMUSCULAR; INTRAVENOUS ONCE
Status: COMPLETED | OUTPATIENT
Start: 2025-06-05 | End: 2025-06-05

## 2025-06-05 RX ORDER — OXYCODONE AND ACETAMINOPHEN 5; 325 MG/1; MG/1
1 TABLET ORAL EVERY 4 HOURS PRN
Qty: 20 TABLET | Refills: 0 | Status: SHIPPED | OUTPATIENT
Start: 2025-06-05 | End: 2025-06-09

## 2025-06-05 RX ORDER — DIAZEPAM 10 MG/2ML
5 INJECTION, SOLUTION INTRAMUSCULAR; INTRAVENOUS ONCE
Status: COMPLETED | OUTPATIENT
Start: 2025-06-05 | End: 2025-06-05

## 2025-06-05 RX ORDER — LIDOCAINE HYDROCHLORIDE AND EPINEPHRINE BITARTRATE 20; .01 MG/ML; MG/ML
10 INJECTION, SOLUTION SUBCUTANEOUS ONCE
Status: COMPLETED | OUTPATIENT
Start: 2025-06-05 | End: 2025-06-05

## 2025-06-05 RX ORDER — MORPHINE SULFATE 4 MG/ML
4 INJECTION INTRAVENOUS ONCE
Status: COMPLETED | OUTPATIENT
Start: 2025-06-05 | End: 2025-06-05

## 2025-06-05 RX ORDER — CEPHALEXIN 500 MG/1
500 CAPSULE ORAL 3 TIMES DAILY
Qty: 15 CAPSULE | Refills: 0 | Status: ACTIVE | OUTPATIENT
Start: 2025-06-05 | End: 2025-06-10

## 2025-06-05 RX ORDER — HYDROMORPHONE HYDROCHLORIDE 1 MG/ML
0.5 INJECTION, SOLUTION INTRAMUSCULAR; INTRAVENOUS; SUBCUTANEOUS ONCE
Status: COMPLETED | OUTPATIENT
Start: 2025-06-05 | End: 2025-06-05

## 2025-06-05 RX ORDER — ONDANSETRON 2 MG/ML
4 INJECTION INTRAMUSCULAR; INTRAVENOUS ONCE
Status: COMPLETED | OUTPATIENT
Start: 2025-06-05 | End: 2025-06-05

## 2025-06-05 RX ADMIN — CEFAZOLIN 2 G: 2 INJECTION, POWDER, FOR SOLUTION INTRAMUSCULAR; INTRAVENOUS at 08:39

## 2025-06-05 RX ADMIN — DIAZEPAM 5 MG: 5 INJECTION, SOLUTION INTRAMUSCULAR; INTRAVENOUS at 09:40

## 2025-06-05 RX ADMIN — HYDROMORPHONE HYDROCHLORIDE 0.5 MG: 1 INJECTION, SOLUTION INTRAMUSCULAR; INTRAVENOUS; SUBCUTANEOUS at 08:39

## 2025-06-05 RX ADMIN — METHYLENE BLUE 5 MG: 5 INJECTION INTRAVENOUS at 11:30

## 2025-06-05 RX ADMIN — MORPHINE SULFATE 4 MG: 4 INJECTION, SOLUTION INTRAMUSCULAR; INTRAVENOUS at 07:33

## 2025-06-05 RX ADMIN — IOHEXOL 100 ML: 350 INJECTION, SOLUTION INTRAVENOUS at 08:09

## 2025-06-05 RX ADMIN — ONDANSETRON 4 MG: 2 INJECTION INTRAMUSCULAR; INTRAVENOUS at 07:33

## 2025-06-05 RX ADMIN — LIDOCAINE HYDROCHLORIDE AND EPINEPHRINE 10 ML: 10; 20 INJECTION, SOLUTION INFILTRATION; PERINEURAL at 09:15

## 2025-06-05 ASSESSMENT — FIBROSIS 4 INDEX: FIB4 SCORE: 0.85

## 2025-06-05 ASSESSMENT — PAIN DESCRIPTION - PAIN TYPE
TYPE: ACUTE PAIN
TYPE: ACUTE PAIN

## 2025-06-05 NOTE — ED NOTES
Provided DC instructions, Rx info, outpt f/u with spine and ortho surg, PIV removed, questions answered. .
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
## 2025-06-05 NOTE — ED NOTES
Dr Santos presents to the bedside to engage in mary conversation regarding the patient's reason for presenting to the ED and POC.

## 2025-06-05 NOTE — ED NOTES
Placed knee immobilizer to left and crutch training. Pt verbalized understanding of placing/removing immobilizer.
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
## 2025-06-09 NOTE — Clinical Note
REFERRAL APPROVAL NOTICE         Sent on June 9, 2025                   Blaise Oliverastrom  [address] [place] [address]                   Dear Mr. Hyatt,    After a careful review of the medical information and benefit coverage, [facility] has processed your referral. See below for additional details.    If applicable, you must be actively enrolled with your insurance for coverage of the authorized service. If you have any questions regarding your coverage, please contact your insurance directly.    REFERRAL INFORMATION   Referral #:  [id number]  Referred-To Provider    Referred-By Provider:  Spine Surgery    Harmeet Santos M.D.   [facility]      [address]  [address]  [place] [address]  [phone] [address]  [place] [address]  [phone]    Referral Start Date:  06/05/2025  Referral End Date:   06/05/2026             SCHEDULING  If you do not already have an appointment, please call [phone] to make an appointment.     MORE INFORMATION  If you do not already have a [employer] account, sign up at: [email or web address]  You can access your medical information, make appointments, see lab results, billing information, and more.  If you have questions regarding this referral, please contact  the [facility] Referrals department at:             [phone]. Monday - Friday 8:00AM - 5:00PM.     Sincerely,    [facility]

## 2025-06-09 NOTE — Clinical Note
REFERRAL APPROVAL NOTICE         Sent on June 9, 2025                   Blaise Hyatt  [address] [place] [address]                   Dear Mr. Hyatt,    After a careful review of the medical information and benefit coverage, [facility] has processed your referral. See below for additional details.    If applicable, you must be actively enrolled with your insurance for coverage of the authorized service. If you have any questions regarding your coverage, please contact your insurance directly.    REFERRAL INFORMATION   Referral #:  [id number]  Referred-To Provider    Referred-By Provider:  Orthopedic Surgery    Harmeet Santos M.D.   [place] [id number]  [address]  [place] [address]  [phone] [address]  [place] [address]  [phone]    Referral Start Date:  06/05/2025  Referral End Date:   06/05/2026             SCHEDULING  If you do not already have an appointment, please call [phone] to make an appointment.     MORE INFORMATION  If you do not already have a [employer] account, sign up at: [email or web address]  You can access your medical information, make appointments, see lab results, billing information, and more.  If you have questions regarding this referral, please contact  the [facility] Referrals department at:             [phone]. Monday - Friday 8:00AM - 5:00PM.     Sincerely,    [facility]

## 2025-06-12 ENCOUNTER — HOSPITAL ENCOUNTER (OUTPATIENT)
Dept: RADIOLOGY | Facility: MEDICAL CENTER | Age: 45
End: 2025-06-12
Attending: PHYSICIAN ASSISTANT
Payer: COMMERCIAL

## 2025-06-12 DIAGNOSIS — S82.042B: ICD-10-CM

## 2025-06-12 PROCEDURE — 73700 CT LOWER EXTREMITY W/O DYE: CPT | Mod: LT

## 2025-06-21 ENCOUNTER — OFFICE VISIT (OUTPATIENT)
Dept: URGENT CARE | Facility: CLINIC | Age: 45
End: 2025-06-21
Payer: COMMERCIAL

## 2025-06-21 VITALS
BODY MASS INDEX: 31.5 KG/M2 | OXYGEN SATURATION: 96 % | WEIGHT: 225 LBS | HEIGHT: 71 IN | HEART RATE: 95 BPM | TEMPERATURE: 97.3 F | DIASTOLIC BLOOD PRESSURE: 70 MMHG | SYSTOLIC BLOOD PRESSURE: 124 MMHG

## 2025-06-21 DIAGNOSIS — M70.31 RADIOHUMERAL BURSITIS OF RIGHT ELBOW: Primary | ICD-10-CM

## 2025-06-21 PROCEDURE — 3074F SYST BP LT 130 MM HG: CPT

## 2025-06-21 PROCEDURE — 99214 OFFICE O/P EST MOD 30 MIN: CPT

## 2025-06-21 PROCEDURE — 3078F DIAST BP <80 MM HG: CPT

## 2025-06-21 RX ORDER — OXYCODONE AND ACETAMINOPHEN 5; 325 MG/1; MG/1
TABLET ORAL
COMMUNITY
Start: 2025-06-21

## 2025-06-21 RX ORDER — METHYLPREDNISOLONE 4 MG/1
TABLET ORAL
COMMUNITY
Start: 2025-06-11

## 2025-06-21 RX ORDER — CYCLOBENZAPRINE HCL 10 MG
10 TABLET ORAL
COMMUNITY
Start: 2025-06-11

## 2025-06-21 ASSESSMENT — ENCOUNTER SYMPTOMS
EYE REDNESS: 0
COUGH: 0
EYE DISCHARGE: 0
VOMITING: 0
WHEEZING: 0
FEVER: 0
PAIN: 1
DIARRHEA: 0
BRUISES/BLEEDS EASILY: 0
CONSTIPATION: 0
BLOOD IN STOOL: 0

## 2025-06-21 ASSESSMENT — FIBROSIS 4 INDEX: FIB4 SCORE: 1.34
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
## 2025-06-24 ENCOUNTER — HOSPITAL ENCOUNTER (OUTPATIENT)
Dept: RADIOLOGY | Facility: MEDICAL CENTER | Age: 45
End: 2025-06-24
Attending: PHYSICIAN ASSISTANT
Payer: COMMERCIAL

## 2025-06-24 DIAGNOSIS — S82.042B: ICD-10-CM

## 2025-06-24 PROCEDURE — 73721 MRI JNT OF LWR EXTRE W/O DYE: CPT | Mod: LT

## 2025-06-27 NOTE — Clinical Note
REFERRAL APPROVAL NOTICE         Sent on June 27, 2025                   Blaise Hyatt  [address] [place] [address]                   Dear Mr. Hyatt,    After a careful review of the medical information and benefit coverage, [facility] has processed your referral. See below for additional details.    If applicable, you must be actively enrolled with your insurance for coverage of the authorized service. If you have any questions regarding your coverage, please contact your insurance directly.    REFERRAL INFORMATION   Referral #:  [id number]  Referred-To Department    Referred-By Provider:  Sports Medicine    Candice Obregon MD, PhD   [facility]      [address]  [address]  [place] [address]  [phone] [address]  [place] [id number] [phone]    Referral Start Date:  06/21/2025  Referral End Date:   06/21/2026             SCHEDULING  If you do not already have an appointment, please call [phone] to make an appointment.     MORE INFORMATION  If you do not already have a [email or web address] account, sign up at: [email or web address]  You can access your medical information, make appointments, see lab results, billing information, and more.  If you have questions regarding this referral, please contact  the [facility] Referrals department at:             [phone]. Monday - Friday 8:00AM - 5:00PM.     Sincerely,    [facility]

## 2025-08-25 ENCOUNTER — OFFICE VISIT (OUTPATIENT)
Dept: URGENT CARE | Facility: CLINIC | Age: 45
End: 2025-08-25
Payer: COMMERCIAL

## 2025-08-25 VITALS
DIASTOLIC BLOOD PRESSURE: 90 MMHG | RESPIRATION RATE: 16 BRPM | TEMPERATURE: 98 F | WEIGHT: 230 LBS | SYSTOLIC BLOOD PRESSURE: 138 MMHG | HEART RATE: 68 BPM | BODY MASS INDEX: 32.2 KG/M2 | OXYGEN SATURATION: 97 % | HEIGHT: 71 IN

## 2025-08-25 DIAGNOSIS — G89.29 CHRONIC PAIN OF RIGHT HIP: Primary | ICD-10-CM

## 2025-08-25 DIAGNOSIS — M25.562 LEFT KNEE PAIN, UNSPECIFIED CHRONICITY: ICD-10-CM

## 2025-08-25 DIAGNOSIS — M25.551 CHRONIC PAIN OF RIGHT HIP: Primary | ICD-10-CM

## 2025-08-25 PROCEDURE — 99213 OFFICE O/P EST LOW 20 MIN: CPT | Performed by: PHYSICIAN ASSISTANT

## 2025-08-25 PROCEDURE — 3080F DIAST BP >= 90 MM HG: CPT | Performed by: PHYSICIAN ASSISTANT

## 2025-08-25 PROCEDURE — 3075F SYST BP GE 130 - 139MM HG: CPT | Performed by: PHYSICIAN ASSISTANT

## 2025-08-25 ASSESSMENT — ENCOUNTER SYMPTOMS
DIAPHORESIS: 0
WEIGHT LOSS: 0
FEVER: 0
FALLS: 0
CHILLS: 0
TINGLING: 0
MYALGIAS: 1
WEAKNESS: 0

## 2025-08-25 ASSESSMENT — FIBROSIS 4 INDEX: FIB4 SCORE: 1.34

## 2025-08-28 ENCOUNTER — OFFICE VISIT (OUTPATIENT)
Dept: MEDICAL GROUP | Facility: MEDICAL CENTER | Age: 45
End: 2025-08-28
Payer: COMMERCIAL

## 2025-08-28 VITALS
SYSTOLIC BLOOD PRESSURE: 138 MMHG | HEIGHT: 71 IN | HEART RATE: 81 BPM | BODY MASS INDEX: 30.85 KG/M2 | OXYGEN SATURATION: 97 % | TEMPERATURE: 97.8 F | DIASTOLIC BLOOD PRESSURE: 78 MMHG | RESPIRATION RATE: 12 BRPM | WEIGHT: 220.4 LBS

## 2025-08-28 DIAGNOSIS — F41.1 GENERALIZED ANXIETY DISORDER WITH PANIC ATTACKS: ICD-10-CM

## 2025-08-28 DIAGNOSIS — Z11.4 ENCOUNTER FOR SCREENING FOR HIV: ICD-10-CM

## 2025-08-28 DIAGNOSIS — E55.9 VITAMIN D DEFICIENCY: ICD-10-CM

## 2025-08-28 DIAGNOSIS — F41.0 GENERALIZED ANXIETY DISORDER WITH PANIC ATTACKS: ICD-10-CM

## 2025-08-28 DIAGNOSIS — E78.2 MIXED HYPERLIPIDEMIA: ICD-10-CM

## 2025-08-28 DIAGNOSIS — Z11.59 NEED FOR HEPATITIS C SCREENING TEST: ICD-10-CM

## 2025-08-28 DIAGNOSIS — E66.9 OBESITY (BMI 30-39.9): ICD-10-CM

## 2025-08-28 DIAGNOSIS — R73.03 PREDIABETES: ICD-10-CM

## 2025-08-28 DIAGNOSIS — G47.30 SLEEP APNEA, UNSPECIFIED TYPE: ICD-10-CM

## 2025-08-28 DIAGNOSIS — R42 VERTIGO: Primary | ICD-10-CM

## 2025-08-28 PROBLEM — S82.042B: Status: ACTIVE | Noted: 2025-06-08

## 2025-08-28 PROBLEM — S22.000A COMPRESSION FRACTURE OF THORACIC VERTEBRA (HCC): Status: ACTIVE | Noted: 2025-06-10

## 2025-08-28 PROBLEM — F10.929 ACUTE ALCOHOL INTOXICATION (HCC): Status: RESOLVED | Noted: 2023-09-14 | Resolved: 2025-08-28

## 2025-08-28 PROCEDURE — 3078F DIAST BP <80 MM HG: CPT | Performed by: FAMILY MEDICINE

## 2025-08-28 PROCEDURE — 3075F SYST BP GE 130 - 139MM HG: CPT | Performed by: FAMILY MEDICINE

## 2025-08-28 PROCEDURE — 99214 OFFICE O/P EST MOD 30 MIN: CPT | Performed by: FAMILY MEDICINE

## 2025-08-28 RX ORDER — MECLIZINE HCL 12.5 MG 12.5 MG/1
12.5-25 TABLET ORAL 3 TIMES DAILY PRN
Qty: 90 TABLET | Refills: 0 | Status: SHIPPED | OUTPATIENT
Start: 2025-08-28

## 2025-08-28 ASSESSMENT — FIBROSIS 4 INDEX: FIB4 SCORE: 1.34
